# Patient Record
Sex: FEMALE | Race: WHITE | NOT HISPANIC OR LATINO | Employment: FULL TIME | URBAN - METROPOLITAN AREA
[De-identification: names, ages, dates, MRNs, and addresses within clinical notes are randomized per-mention and may not be internally consistent; named-entity substitution may affect disease eponyms.]

---

## 2017-01-06 ENCOUNTER — AMBULATORY - HEALTHEAST (OUTPATIENT)
Dept: NURSING | Facility: CLINIC | Age: 47
End: 2017-01-06

## 2017-01-13 ENCOUNTER — COMMUNICATION - HEALTHEAST (OUTPATIENT)
Dept: FAMILY MEDICINE | Facility: CLINIC | Age: 47
End: 2017-01-13

## 2017-01-19 ENCOUNTER — AMBULATORY - HEALTHEAST (OUTPATIENT)
Dept: NURSING | Facility: CLINIC | Age: 47
End: 2017-01-19

## 2017-01-19 DIAGNOSIS — E03.9 HYPOTHYROIDISM: ICD-10-CM

## 2017-01-19 DIAGNOSIS — E78.5 HYPERLIPIDEMIA, UNSPECIFIED HYPERLIPIDEMIA TYPE: ICD-10-CM

## 2017-01-19 LAB
CHOLEST SERPL-MCNC: 200 MG/DL
FASTING STATUS PATIENT QL REPORTED: YES
HDLC SERPL-MCNC: 76 MG/DL
LDLC SERPL CALC-MCNC: 104 MG/DL
TRIGL SERPL-MCNC: 98 MG/DL

## 2017-02-15 ENCOUNTER — COMMUNICATION - HEALTHEAST (OUTPATIENT)
Dept: FAMILY MEDICINE | Facility: CLINIC | Age: 47
End: 2017-02-15

## 2017-02-15 DIAGNOSIS — E03.9 HYPOTHYROIDISM: ICD-10-CM

## 2017-02-15 DIAGNOSIS — R53.83 FATIGUE: ICD-10-CM

## 2017-04-20 ENCOUNTER — COMMUNICATION - HEALTHEAST (OUTPATIENT)
Dept: FAMILY MEDICINE | Facility: CLINIC | Age: 47
End: 2017-04-20

## 2017-05-23 ENCOUNTER — COMMUNICATION - HEALTHEAST (OUTPATIENT)
Dept: FAMILY MEDICINE | Facility: CLINIC | Age: 47
End: 2017-05-23

## 2017-06-02 ENCOUNTER — OFFICE VISIT - HEALTHEAST (OUTPATIENT)
Dept: FAMILY MEDICINE | Facility: CLINIC | Age: 47
End: 2017-06-02

## 2017-06-02 DIAGNOSIS — E03.9 HYPOTHYROIDISM, UNSPECIFIED TYPE: ICD-10-CM

## 2017-06-02 DIAGNOSIS — M79.7 FIBROMYALGIA: ICD-10-CM

## 2017-06-02 DIAGNOSIS — H69.93 ETD (EUSTACHIAN TUBE DYSFUNCTION), BILATERAL: ICD-10-CM

## 2017-06-02 ASSESSMENT — MIFFLIN-ST. JEOR: SCORE: 1128.47

## 2017-06-02 NOTE — ASSESSMENT & PLAN NOTE
Given her current symptoms of fatigue and weight changes, will recheck thyroid level.  The way changes are most likely due to her continual change in dosing of her Lyrica and Wellbutrin.  Discussed the effects of starting and stopping as well as the beneficial effects of staying on continually.

## 2017-06-02 NOTE — ASSESSMENT & PLAN NOTE
Continue Lyrica at 75 mg, but use the 75 mg capsule and not trying to separate a 150 mg capsule.  Relaxation and dietary discussion.  Follow-up in 6 months, sooner if warning signs or symptoms asdiscussed.

## 2017-10-16 ENCOUNTER — COMMUNICATION - HEALTHEAST (OUTPATIENT)
Dept: FAMILY MEDICINE | Facility: CLINIC | Age: 47
End: 2017-10-16

## 2017-10-16 DIAGNOSIS — E03.9 HYPOTHYROIDISM: ICD-10-CM

## 2017-12-06 ENCOUNTER — COMMUNICATION - HEALTHEAST (OUTPATIENT)
Dept: SCHEDULING | Facility: CLINIC | Age: 47
End: 2017-12-06

## 2017-12-08 ENCOUNTER — OFFICE VISIT - HEALTHEAST (OUTPATIENT)
Dept: FAMILY MEDICINE | Facility: CLINIC | Age: 47
End: 2017-12-08

## 2017-12-08 DIAGNOSIS — M79.7 FIBROMYALGIA: ICD-10-CM

## 2017-12-08 DIAGNOSIS — E78.5 HYPERLIPIDEMIA, UNSPECIFIED HYPERLIPIDEMIA TYPE: ICD-10-CM

## 2017-12-08 DIAGNOSIS — R07.9 CHEST PAIN: ICD-10-CM

## 2017-12-08 DIAGNOSIS — E55.9 VITAMIN D DEFICIENCY: ICD-10-CM

## 2017-12-08 DIAGNOSIS — Z12.31 VISIT FOR SCREENING MAMMOGRAM: ICD-10-CM

## 2017-12-08 DIAGNOSIS — K21.9 GASTROESOPHAGEAL REFLUX DISEASE WITHOUT ESOPHAGITIS: ICD-10-CM

## 2017-12-08 DIAGNOSIS — E03.9 HYPOTHYROIDISM, UNSPECIFIED TYPE: ICD-10-CM

## 2017-12-08 LAB
ATRIAL RATE - MUSE: 65 BPM
DIASTOLIC BLOOD PRESSURE - MUSE: 60 MMHG
INTERPRETATION ECG - MUSE: NORMAL
P AXIS - MUSE: 58 DEGREES
PR INTERVAL - MUSE: 148 MS
QRS DURATION - MUSE: 74 MS
QT - MUSE: 384 MS
QTC - MUSE: 399 MS
R AXIS - MUSE: 86 DEGREES
SYSTOLIC BLOOD PRESSURE - MUSE: 104 MMHG
T AXIS - MUSE: 60 DEGREES
VENTRICULAR RATE- MUSE: 65 BPM

## 2017-12-08 ASSESSMENT — MIFFLIN-ST. JEOR: SCORE: 1157.95

## 2017-12-08 NOTE — ASSESSMENT & PLAN NOTE
Currently on levothyroxine 100 mcg daily.  Will recheck TSH and adjust dosing as needed.  If dose adjustment and recheck in 2 months, if on stable dose and recheck in 6 months at annual physical.

## 2017-12-08 NOTE — ASSESSMENT & PLAN NOTE
Start H2 blocker twice a day.  If not improving in the next 2 weeks, then will adjust anxiety medications by increasing Wellbutrin, and consider EGD for further evaluation.

## 2017-12-12 ENCOUNTER — HOSPITAL ENCOUNTER (OUTPATIENT)
Dept: MAMMOGRAPHY | Facility: HOSPITAL | Age: 47
Discharge: HOME | End: 2017-12-12
Attending: FAMILY MEDICINE

## 2017-12-12 DIAGNOSIS — Z12.31 VISIT FOR SCREENING MAMMOGRAM: ICD-10-CM

## 2017-12-26 ENCOUNTER — HOSPITAL ENCOUNTER (OUTPATIENT)
Dept: CARDIOLOGY | Facility: CLINIC | Age: 47
Discharge: HOME | End: 2017-12-26
Attending: FAMILY MEDICINE

## 2017-12-26 DIAGNOSIS — R07.9 CHEST PAIN: ICD-10-CM

## 2017-12-26 LAB
CV STRESS CURRENT BP HE: NORMAL
CV STRESS CURRENT HR HE: 100
CV STRESS CURRENT HR HE: 102
CV STRESS CURRENT HR HE: 105
CV STRESS CURRENT HR HE: 108
CV STRESS CURRENT HR HE: 109
CV STRESS CURRENT HR HE: 109
CV STRESS CURRENT HR HE: 115
CV STRESS CURRENT HR HE: 116
CV STRESS CURRENT HR HE: 117
CV STRESS CURRENT HR HE: 120
CV STRESS CURRENT HR HE: 120
CV STRESS CURRENT HR HE: 124
CV STRESS CURRENT HR HE: 126
CV STRESS CURRENT HR HE: 133
CV STRESS CURRENT HR HE: 138
CV STRESS CURRENT HR HE: 141
CV STRESS CURRENT HR HE: 142
CV STRESS CURRENT HR HE: 143
CV STRESS CURRENT HR HE: 145
CV STRESS CURRENT HR HE: 149
CV STRESS CURRENT HR HE: 149
CV STRESS CURRENT HR HE: 162
CV STRESS CURRENT HR HE: 163
CV STRESS CURRENT HR HE: 171
CV STRESS CURRENT HR HE: 173
CV STRESS CURRENT HR HE: 173
CV STRESS CURRENT HR HE: 89
CV STRESS CURRENT HR HE: 92
CV STRESS CURRENT HR HE: 95
CV STRESS CURRENT HR HE: 98
CV STRESS CURRENT HR HE: 99
CV STRESS DEVIATION TIME HE: NORMAL
CV STRESS ECHO PERCENT HR HE: NORMAL
CV STRESS EXERCISE STAGE HE: NORMAL
CV STRESS FINAL RESTING BP HE: NORMAL
CV STRESS FINAL RESTING HR HE: 95
CV STRESS MAX HR HE: 175
CV STRESS MAX TREADMILL GRADE HE: 16
CV STRESS MAX TREADMILL SPEED HE: 4.2
CV STRESS PEAK DIA BP HE: NORMAL
CV STRESS PEAK SYS BP HE: NORMAL
CV STRESS PHASE HE: NORMAL
CV STRESS PROTOCOL HE: NORMAL
CV STRESS RESTING PT POSITION HE: NORMAL
CV STRESS ST DEVIATION AMOUNT HE: NORMAL
CV STRESS ST DEVIATION ELEVATION HE: NORMAL
CV STRESS ST EVELATION AMOUNT HE: NORMAL
CV STRESS TEST TYPE HE: NORMAL
CV STRESS TOTAL STAGE TIME MIN 1 HE: NORMAL
STRESS ECHO BASELINE BP: NORMAL
STRESS ECHO BASELINE HR: 77
STRESS ECHO CALCULATED PERCENT HR: 101 %
STRESS ECHO LAST STRESS BP: NORMAL
STRESS ECHO LAST STRESS HR: 173
STRESS ECHO POST ESTIMATED WORKLOAD: 12.1
STRESS ECHO POST EXERCISE DUR MIN: 11
STRESS ECHO POST EXERCISE DUR SEC: 0
STRESS ECHO TARGET HR: 148

## 2018-01-16 ENCOUNTER — COMMUNICATION - HEALTHEAST (OUTPATIENT)
Dept: FAMILY MEDICINE | Facility: CLINIC | Age: 48
End: 2018-01-16

## 2018-01-16 DIAGNOSIS — E78.5 HYPERLIPIDEMIA: ICD-10-CM

## 2018-01-17 ENCOUNTER — COMMUNICATION - HEALTHEAST (OUTPATIENT)
Dept: FAMILY MEDICINE | Facility: CLINIC | Age: 48
End: 2018-01-17

## 2018-01-17 DIAGNOSIS — R07.9 CHEST PAIN: ICD-10-CM

## 2018-02-07 ENCOUNTER — COMMUNICATION - HEALTHEAST (OUTPATIENT)
Dept: FAMILY MEDICINE | Facility: CLINIC | Age: 48
End: 2018-02-07

## 2018-02-07 DIAGNOSIS — R07.9 CHEST PAIN: ICD-10-CM

## 2018-03-01 ENCOUNTER — OFFICE VISIT - HEALTHEAST (OUTPATIENT)
Dept: FAMILY MEDICINE | Facility: CLINIC | Age: 48
End: 2018-03-01

## 2018-03-01 DIAGNOSIS — R10.31 RIGHT LOWER QUADRANT ABDOMINAL PAIN: ICD-10-CM

## 2018-03-01 DIAGNOSIS — Z23 NEED FOR VACCINATION: ICD-10-CM

## 2018-03-01 DIAGNOSIS — M79.7 FIBROMYALGIA: ICD-10-CM

## 2018-03-01 ASSESSMENT — MIFFLIN-ST. JEOR: SCORE: 1155.23

## 2018-03-01 NOTE — ASSESSMENT & PLAN NOTE
Patient did restart her Wellbutrin and is feeling better since doing so.  She restarted 10 days ago.  She states her mood has improved considerably.  Will plan to continue at this time.

## 2018-04-20 ENCOUNTER — COMMUNICATION - HEALTHEAST (OUTPATIENT)
Dept: FAMILY MEDICINE | Facility: CLINIC | Age: 48
End: 2018-04-20

## 2018-04-20 DIAGNOSIS — R53.83 FATIGUE: ICD-10-CM

## 2018-07-14 ENCOUNTER — COMMUNICATION - HEALTHEAST (OUTPATIENT)
Dept: FAMILY MEDICINE | Facility: CLINIC | Age: 48
End: 2018-07-14

## 2018-07-14 DIAGNOSIS — E03.9 HYPOTHYROIDISM: ICD-10-CM

## 2018-10-16 ENCOUNTER — COMMUNICATION - HEALTHEAST (OUTPATIENT)
Dept: FAMILY MEDICINE | Facility: CLINIC | Age: 48
End: 2018-10-16

## 2018-10-16 DIAGNOSIS — R53.83 FATIGUE: ICD-10-CM

## 2018-10-16 DIAGNOSIS — R07.9 CHEST PAIN: ICD-10-CM

## 2018-10-26 ENCOUNTER — COMMUNICATION - HEALTHEAST (OUTPATIENT)
Dept: FAMILY MEDICINE | Facility: CLINIC | Age: 48
End: 2018-10-26

## 2018-10-26 DIAGNOSIS — E78.5 HYPERLIPIDEMIA: ICD-10-CM

## 2018-11-12 ENCOUNTER — AMBULATORY - HEALTHEAST (OUTPATIENT)
Dept: NURSING | Facility: CLINIC | Age: 48
End: 2018-11-12

## 2018-12-10 ENCOUNTER — OFFICE VISIT - HEALTHEAST (OUTPATIENT)
Dept: FAMILY MEDICINE | Facility: CLINIC | Age: 48
End: 2018-12-10

## 2018-12-10 DIAGNOSIS — E78.5 HYPERLIPIDEMIA, UNSPECIFIED HYPERLIPIDEMIA TYPE: ICD-10-CM

## 2018-12-10 DIAGNOSIS — E03.9 HYPOTHYROIDISM, UNSPECIFIED TYPE: ICD-10-CM

## 2018-12-10 DIAGNOSIS — M25.561 CHRONIC PAIN OF RIGHT KNEE: ICD-10-CM

## 2018-12-10 DIAGNOSIS — M67.40 GANGLION CYST: ICD-10-CM

## 2018-12-10 DIAGNOSIS — G89.29 CHRONIC PAIN OF RIGHT KNEE: ICD-10-CM

## 2018-12-10 DIAGNOSIS — E55.9 VITAMIN D DEFICIENCY: ICD-10-CM

## 2018-12-10 ASSESSMENT — MIFFLIN-ST. JEOR: SCORE: 1133.46

## 2018-12-12 ENCOUNTER — HOSPITAL ENCOUNTER (OUTPATIENT)
Dept: MRI IMAGING | Facility: CLINIC | Age: 48
Discharge: HOME | End: 2018-12-12
Attending: FAMILY MEDICINE

## 2018-12-12 ENCOUNTER — AMBULATORY - HEALTHEAST (OUTPATIENT)
Dept: LAB | Facility: CLINIC | Age: 48
End: 2018-12-12

## 2018-12-12 DIAGNOSIS — G89.29 CHRONIC PAIN OF RIGHT KNEE: ICD-10-CM

## 2018-12-12 DIAGNOSIS — M25.561 CHRONIC PAIN OF RIGHT KNEE: ICD-10-CM

## 2018-12-12 DIAGNOSIS — E55.9 VITAMIN D DEFICIENCY: ICD-10-CM

## 2018-12-12 DIAGNOSIS — E78.5 HYPERLIPIDEMIA, UNSPECIFIED HYPERLIPIDEMIA TYPE: ICD-10-CM

## 2018-12-12 DIAGNOSIS — E03.9 HYPOTHYROIDISM, UNSPECIFIED TYPE: ICD-10-CM

## 2018-12-12 LAB
ALT SERPL W P-5'-P-CCNC: 12 U/L (ref 0–45)
ANION GAP SERPL CALCULATED.3IONS-SCNC: 10 MMOL/L (ref 5–18)
BUN SERPL-MCNC: 10 MG/DL (ref 8–22)
CALCIUM SERPL-MCNC: 9.4 MG/DL (ref 8.5–10.5)
CHLORIDE BLD-SCNC: 104 MMOL/L (ref 98–107)
CHOLEST SERPL-MCNC: 198 MG/DL
CO2 SERPL-SCNC: 24 MMOL/L (ref 22–31)
CREAT SERPL-MCNC: 0.83 MG/DL (ref 0.6–1.1)
ERYTHROCYTE [DISTWIDTH] IN BLOOD BY AUTOMATED COUNT: 11.1 % (ref 11–14.5)
FASTING STATUS PATIENT QL REPORTED: YES
GFR SERPL CREATININE-BSD FRML MDRD: >60 ML/MIN/1.73M2
GLUCOSE BLD-MCNC: 82 MG/DL (ref 70–125)
HCT VFR BLD AUTO: 39.3 % (ref 35–47)
HDLC SERPL-MCNC: 77 MG/DL
HGB BLD-MCNC: 13.1 G/DL (ref 12–16)
LDLC SERPL CALC-MCNC: 104 MG/DL
MCH RBC QN AUTO: 30 PG (ref 27–34)
MCHC RBC AUTO-ENTMCNC: 33.3 G/DL (ref 32–36)
MCV RBC AUTO: 90 FL (ref 80–100)
PLATELET # BLD AUTO: 267 THOU/UL (ref 140–440)
PMV BLD AUTO: 7.7 FL (ref 7–10)
POTASSIUM BLD-SCNC: 4.5 MMOL/L (ref 3.5–5)
RBC # BLD AUTO: 4.36 MILL/UL (ref 3.8–5.4)
SODIUM SERPL-SCNC: 138 MMOL/L (ref 136–145)
TRIGL SERPL-MCNC: 84 MG/DL
TSH SERPL DL<=0.005 MIU/L-ACNC: 1.97 UIU/ML (ref 0.3–5)
WBC: 7.9 THOU/UL (ref 4–11)

## 2018-12-13 LAB
25(OH)D3 SERPL-MCNC: 27.5 NG/ML (ref 30–80)
25(OH)D3 SERPL-MCNC: 27.5 NG/ML (ref 30–80)

## 2018-12-17 ENCOUNTER — AMBULATORY - HEALTHEAST (OUTPATIENT)
Dept: FAMILY MEDICINE | Facility: CLINIC | Age: 48
End: 2018-12-17

## 2018-12-17 ENCOUNTER — COMMUNICATION - HEALTHEAST (OUTPATIENT)
Dept: FAMILY MEDICINE | Facility: CLINIC | Age: 48
End: 2018-12-17

## 2018-12-17 DIAGNOSIS — E55.9 VITAMIN D DEFICIENCY: ICD-10-CM

## 2018-12-17 DIAGNOSIS — G89.29 CHRONIC PAIN OF RIGHT KNEE: ICD-10-CM

## 2018-12-17 DIAGNOSIS — M25.561 CHRONIC PAIN OF RIGHT KNEE: ICD-10-CM

## 2019-01-07 ENCOUNTER — OFFICE VISIT - HEALTHEAST (OUTPATIENT)
Dept: FAMILY MEDICINE | Facility: CLINIC | Age: 49
End: 2019-01-07

## 2019-01-07 DIAGNOSIS — R39.9 UTI SYMPTOMS: ICD-10-CM

## 2019-01-07 LAB
ALBUMIN UR-MCNC: NEGATIVE MG/DL
APPEARANCE UR: CLEAR
BILIRUB UR QL STRIP: NEGATIVE
COLOR UR AUTO: YELLOW
GLUCOSE UR STRIP-MCNC: NEGATIVE MG/DL
HGB UR QL STRIP: ABNORMAL
KETONES UR STRIP-MCNC: NEGATIVE MG/DL
LEUKOCYTE ESTERASE UR QL STRIP: NEGATIVE
NITRATE UR QL: NEGATIVE
PH UR STRIP: 7 [PH] (ref 5–8)
SP GR UR STRIP: 1.02 (ref 1–1.03)
UROBILINOGEN UR STRIP-ACNC: ABNORMAL

## 2019-01-08 LAB — BACTERIA SPEC CULT: NO GROWTH

## 2019-01-09 ENCOUNTER — COMMUNICATION - HEALTHEAST (OUTPATIENT)
Dept: FAMILY MEDICINE | Facility: CLINIC | Age: 49
End: 2019-01-09

## 2019-01-15 ENCOUNTER — RECORDS - HEALTHEAST (OUTPATIENT)
Dept: ADMINISTRATIVE | Facility: OTHER | Age: 49
End: 2019-01-15

## 2019-01-15 ENCOUNTER — TRANSFERRED RECORDS (OUTPATIENT)
Dept: MULTI SPECIALTY CLINIC | Facility: CLINIC | Age: 49
End: 2019-01-15

## 2019-01-15 LAB
HPV_EXT - HISTORICAL: NORMAL
PAP-ABSTRACT: NORMAL

## 2019-01-16 ENCOUNTER — COMMUNICATION - HEALTHEAST (OUTPATIENT)
Dept: FAMILY MEDICINE | Facility: CLINIC | Age: 49
End: 2019-01-16

## 2019-01-16 ENCOUNTER — RECORDS - HEALTHEAST (OUTPATIENT)
Dept: ADMINISTRATIVE | Facility: OTHER | Age: 49
End: 2019-01-16

## 2019-01-20 ENCOUNTER — COMMUNICATION - HEALTHEAST (OUTPATIENT)
Dept: FAMILY MEDICINE | Facility: CLINIC | Age: 49
End: 2019-01-20

## 2019-01-20 DIAGNOSIS — E03.9 HYPOTHYROIDISM: ICD-10-CM

## 2019-01-23 ENCOUNTER — COMMUNICATION - HEALTHEAST (OUTPATIENT)
Dept: FAMILY MEDICINE | Facility: CLINIC | Age: 49
End: 2019-01-23

## 2019-01-23 DIAGNOSIS — E78.5 HYPERLIPIDEMIA: ICD-10-CM

## 2019-04-12 ENCOUNTER — OFFICE VISIT - HEALTHEAST (OUTPATIENT)
Dept: FAMILY MEDICINE | Facility: CLINIC | Age: 49
End: 2019-04-12

## 2019-04-12 DIAGNOSIS — M75.82 TENDINITIS OF LEFT PECTORALIS MAJOR: ICD-10-CM

## 2019-04-12 DIAGNOSIS — Z12.31 VISIT FOR SCREENING MAMMOGRAM: ICD-10-CM

## 2019-04-12 ASSESSMENT — MIFFLIN-ST. JEOR: SCORE: 1127.11

## 2019-04-20 ENCOUNTER — COMMUNICATION - HEALTHEAST (OUTPATIENT)
Dept: FAMILY MEDICINE | Facility: CLINIC | Age: 49
End: 2019-04-20

## 2019-04-20 DIAGNOSIS — R53.83 FATIGUE: ICD-10-CM

## 2019-04-20 DIAGNOSIS — R07.9 CHEST PAIN: ICD-10-CM

## 2019-04-30 ENCOUNTER — HOSPITAL ENCOUNTER (OUTPATIENT)
Dept: MAMMOGRAPHY | Facility: CLINIC | Age: 49
Discharge: HOME | End: 2019-04-30
Attending: FAMILY MEDICINE

## 2019-04-30 DIAGNOSIS — Z12.31 VISIT FOR SCREENING MAMMOGRAM: ICD-10-CM

## 2019-05-28 ENCOUNTER — OFFICE VISIT - HEALTHEAST (OUTPATIENT)
Dept: PHYSICAL THERAPY | Facility: REHABILITATION | Age: 49
End: 2019-05-28

## 2019-05-28 DIAGNOSIS — G89.29 CHRONIC PAIN OF RIGHT KNEE: ICD-10-CM

## 2019-05-28 DIAGNOSIS — M25.561 CHRONIC PAIN OF RIGHT KNEE: ICD-10-CM

## 2019-05-28 DIAGNOSIS — M62.81 GENERALIZED MUSCLE WEAKNESS: ICD-10-CM

## 2019-06-04 ENCOUNTER — OFFICE VISIT - HEALTHEAST (OUTPATIENT)
Dept: PHYSICAL THERAPY | Facility: REHABILITATION | Age: 49
End: 2019-06-04

## 2019-06-04 DIAGNOSIS — M62.81 GENERALIZED MUSCLE WEAKNESS: ICD-10-CM

## 2019-06-04 DIAGNOSIS — G89.29 CHRONIC PAIN OF RIGHT KNEE: ICD-10-CM

## 2019-06-04 DIAGNOSIS — M25.561 CHRONIC PAIN OF RIGHT KNEE: ICD-10-CM

## 2019-06-11 ENCOUNTER — OFFICE VISIT - HEALTHEAST (OUTPATIENT)
Dept: PHYSICAL THERAPY | Facility: REHABILITATION | Age: 49
End: 2019-06-11

## 2019-06-11 DIAGNOSIS — M62.81 GENERALIZED MUSCLE WEAKNESS: ICD-10-CM

## 2019-06-11 DIAGNOSIS — G89.29 CHRONIC PAIN OF RIGHT KNEE: ICD-10-CM

## 2019-06-11 DIAGNOSIS — M25.561 CHRONIC PAIN OF RIGHT KNEE: ICD-10-CM

## 2019-06-18 ENCOUNTER — OFFICE VISIT - HEALTHEAST (OUTPATIENT)
Dept: PHYSICAL THERAPY | Facility: REHABILITATION | Age: 49
End: 2019-06-18

## 2019-06-18 DIAGNOSIS — G89.29 CHRONIC PAIN OF RIGHT KNEE: ICD-10-CM

## 2019-06-18 DIAGNOSIS — M25.561 CHRONIC PAIN OF RIGHT KNEE: ICD-10-CM

## 2019-06-18 DIAGNOSIS — M62.81 GENERALIZED MUSCLE WEAKNESS: ICD-10-CM

## 2019-07-02 ENCOUNTER — OFFICE VISIT - HEALTHEAST (OUTPATIENT)
Dept: PHYSICAL THERAPY | Facility: REHABILITATION | Age: 49
End: 2019-07-02

## 2019-07-02 DIAGNOSIS — M25.561 CHRONIC PAIN OF RIGHT KNEE: ICD-10-CM

## 2019-07-02 DIAGNOSIS — M62.81 GENERALIZED MUSCLE WEAKNESS: ICD-10-CM

## 2019-07-02 DIAGNOSIS — G89.29 CHRONIC PAIN OF RIGHT KNEE: ICD-10-CM

## 2019-07-10 ENCOUNTER — OFFICE VISIT - HEALTHEAST (OUTPATIENT)
Dept: FAMILY MEDICINE | Facility: CLINIC | Age: 49
End: 2019-07-10

## 2019-07-10 DIAGNOSIS — L71.9 ROSACEA: ICD-10-CM
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
## 2019-07-10 ASSESSMENT — MIFFLIN-ST. JEOR: SCORE: 1139.81

## 2019-07-16 ENCOUNTER — OFFICE VISIT - HEALTHEAST (OUTPATIENT)
Dept: PHYSICAL THERAPY | Facility: REHABILITATION | Age: 49
End: 2019-07-16

## 2019-07-16 DIAGNOSIS — M62.81 GENERALIZED MUSCLE WEAKNESS: ICD-10-CM

## 2019-07-16 DIAGNOSIS — M25.561 CHRONIC PAIN OF RIGHT KNEE: ICD-10-CM

## 2019-07-16 DIAGNOSIS — G89.29 CHRONIC PAIN OF RIGHT KNEE: ICD-10-CM

## 2019-07-31 ENCOUNTER — AMBULATORY - HEALTHEAST (OUTPATIENT)
Dept: LAB | Facility: CLINIC | Age: 49
End: 2019-07-31

## 2019-07-31 ENCOUNTER — AMBULATORY - HEALTHEAST (OUTPATIENT)
Dept: FAMILY MEDICINE | Facility: CLINIC | Age: 49
End: 2019-07-31

## 2019-07-31 ENCOUNTER — OFFICE VISIT - HEALTHEAST (OUTPATIENT)
Dept: FAMILY MEDICINE | Facility: CLINIC | Age: 49
End: 2019-07-31

## 2019-07-31 DIAGNOSIS — E03.9 HYPOTHYROIDISM, UNSPECIFIED TYPE: ICD-10-CM

## 2019-07-31 DIAGNOSIS — E55.9 VITAMIN D DEFICIENCY: ICD-10-CM

## 2019-07-31 DIAGNOSIS — E78.5 HYPERLIPIDEMIA, UNSPECIFIED HYPERLIPIDEMIA TYPE: ICD-10-CM

## 2019-07-31 DIAGNOSIS — K21.9 GASTROESOPHAGEAL REFLUX DISEASE WITHOUT ESOPHAGITIS: ICD-10-CM

## 2019-07-31 DIAGNOSIS — M79.7 FIBROMYALGIA: ICD-10-CM

## 2019-07-31 DIAGNOSIS — L71.9 ROSACEA: ICD-10-CM

## 2019-07-31 DIAGNOSIS — Z11.4 SCREENING FOR HIV WITHOUT PRESENCE OF RISK FACTORS: ICD-10-CM

## 2019-07-31 DIAGNOSIS — Z13.220 LIPID SCREENING: ICD-10-CM

## 2019-07-31 DIAGNOSIS — Z13.1 DIABETES MELLITUS SCREENING: ICD-10-CM

## 2019-07-31 DIAGNOSIS — Z00.00 ROUTINE GENERAL MEDICAL EXAMINATION AT A HEALTH CARE FACILITY: ICD-10-CM

## 2019-07-31 LAB
ALT SERPL W P-5'-P-CCNC: 11 U/L (ref 0–45)
ANION GAP SERPL CALCULATED.3IONS-SCNC: 11 MMOL/L (ref 5–18)
BUN SERPL-MCNC: 12 MG/DL (ref 8–22)
CALCIUM SERPL-MCNC: 9.9 MG/DL (ref 8.5–10.5)
CHLORIDE BLD-SCNC: 103 MMOL/L (ref 98–107)
CHOLEST SERPL-MCNC: 227 MG/DL
CO2 SERPL-SCNC: 24 MMOL/L (ref 22–31)
CREAT SERPL-MCNC: 0.98 MG/DL (ref 0.6–1.1)
ERYTHROCYTE [DISTWIDTH] IN BLOOD BY AUTOMATED COUNT: 11.9 % (ref 11–14.5)
FASTING STATUS PATIENT QL REPORTED: YES
GFR SERPL CREATININE-BSD FRML MDRD: >60 ML/MIN/1.73M2
GLUCOSE BLD-MCNC: 88 MG/DL (ref 70–125)
HCT VFR BLD AUTO: 41.8 % (ref 35–47)
HDLC SERPL-MCNC: 73 MG/DL
HGB BLD-MCNC: 14 G/DL (ref 12–16)
HIV 1+2 AB+HIV1 P24 AG SERPL QL IA: NEGATIVE
LDLC SERPL CALC-MCNC: 127 MG/DL
MCH RBC QN AUTO: 30.2 PG (ref 27–34)
MCHC RBC AUTO-ENTMCNC: 33.5 G/DL (ref 32–36)
MCV RBC AUTO: 90 FL (ref 80–100)
PLATELET # BLD AUTO: 295 THOU/UL (ref 140–440)
PMV BLD AUTO: 7.9 FL (ref 7–10)
POTASSIUM BLD-SCNC: 4.3 MMOL/L (ref 3.5–5)
RBC # BLD AUTO: 4.63 MILL/UL (ref 3.8–5.4)
SODIUM SERPL-SCNC: 138 MMOL/L (ref 136–145)
TRIGL SERPL-MCNC: 137 MG/DL
TSH SERPL DL<=0.005 MIU/L-ACNC: 1.82 UIU/ML (ref 0.3–5)
WBC: 6.4 THOU/UL (ref 4–11)

## 2019-07-31 ASSESSMENT — MIFFLIN-ST. JEOR: SCORE: 1135.27

## 2019-07-31 NOTE — ASSESSMENT & PLAN NOTE
Slight worsening with current life changes.  With through that this is very similar to her grieving process.  Did note that patient isstill making very good choices and staying active in working on doing what her family needs this bite the emotional changes.  Safety plan discussed.  She and her  are going through counseling on their own.  She also has counseling on her own.

## 2019-08-01 LAB
25(OH)D3 SERPL-MCNC: 40.5 NG/ML (ref 30–80)
25(OH)D3 SERPL-MCNC: 40.5 NG/ML (ref 30–80)

## 2019-10-07 ENCOUNTER — RECORDS - HEALTHEAST (OUTPATIENT)
Dept: HEALTH INFORMATION MANAGEMENT | Facility: CLINIC | Age: 49
End: 2019-10-07

## 2020-01-11 ENCOUNTER — COMMUNICATION - HEALTHEAST (OUTPATIENT)
Dept: FAMILY MEDICINE | Facility: CLINIC | Age: 50
End: 2020-01-11

## 2020-01-11 DIAGNOSIS — E78.5 HYPERLIPIDEMIA: ICD-10-CM

## 2020-01-11 DIAGNOSIS — E03.9 HYPOTHYROIDISM: ICD-10-CM

## 2020-01-27 ENCOUNTER — OFFICE VISIT - HEALTHEAST (OUTPATIENT)
Dept: FAMILY MEDICINE | Facility: CLINIC | Age: 50
End: 2020-01-27

## 2020-01-27 DIAGNOSIS — R53.83 OTHER FATIGUE: ICD-10-CM

## 2020-01-27 DIAGNOSIS — M79.7 FIBROMYALGIA: ICD-10-CM

## 2020-01-27 DIAGNOSIS — E03.9 HYPOTHYROIDISM, UNSPECIFIED TYPE: ICD-10-CM

## 2020-01-27 LAB
ERYTHROCYTE [DISTWIDTH] IN BLOOD BY AUTOMATED COUNT: 10.8 % (ref 11–14.5)
HCT VFR BLD AUTO: 42.6 % (ref 35–47)
HGB BLD-MCNC: 14.1 G/DL (ref 12–16)
MCH RBC QN AUTO: 31 PG (ref 27–34)
MCHC RBC AUTO-ENTMCNC: 33.1 G/DL (ref 32–36)
MCV RBC AUTO: 94 FL (ref 80–100)
PLATELET # BLD AUTO: 332 THOU/UL (ref 140–440)
PMV BLD AUTO: 7.4 FL (ref 7–10)
RBC # BLD AUTO: 4.56 MILL/UL (ref 3.8–5.4)
TSH SERPL DL<=0.005 MIU/L-ACNC: 1.3 UIU/ML (ref 0.3–5)
WBC: 7 THOU/UL (ref 4–11)

## 2020-01-27 ASSESSMENT — MIFFLIN-ST. JEOR: SCORE: 1128.92

## 2020-01-27 NOTE — ASSESSMENT & PLAN NOTE
Diet and exercise discussed.  Counseling services through work also discussed.  If those are not avenues for her, she will contact [facility] I can put in referral for counseling specifically.  Also will check labs as per orders.  If TSH and CBC are normal range then would look at increasing Wellbutrin to 300 mg for the next 2 to 3 months during adjustment.  And then come back down to the 150.

## 2020-01-31 ENCOUNTER — AMBULATORY - HEALTHEAST (OUTPATIENT)
Dept: FAMILY MEDICINE | Facility: CLINIC | Age: 50
End: 2020-01-31

## 2020-01-31 DIAGNOSIS — R53.83 FATIGUE: ICD-10-CM

## 2020-02-07 ENCOUNTER — COMMUNICATION - HEALTHEAST (OUTPATIENT)
Dept: FAMILY MEDICINE | Facility: CLINIC | Age: 50
End: 2020-02-07

## 2020-03-04 ENCOUNTER — OFFICE VISIT - HEALTHEAST (OUTPATIENT)
Dept: FAMILY MEDICINE | Facility: CLINIC | Age: 50
End: 2020-03-04

## 2020-03-04 DIAGNOSIS — R53.83 FATIGUE: ICD-10-CM

## 2020-03-04 DIAGNOSIS — M79.7 FIBROMYALGIA: ICD-10-CM

## 2020-03-04 ASSESSMENT — ANXIETY QUESTIONNAIRES
4. TROUBLE RELAXING: NOT AT ALL
GAD7 TOTAL SCORE: 9
3. WORRYING TOO MUCH ABOUT DIFFERENT THINGS: SEVERAL DAYS
6. BECOMING EASILY ANNOYED OR IRRITABLE: SEVERAL DAYS
2. NOT BEING ABLE TO STOP OR CONTROL WORRYING: NEARLY EVERY DAY
1. FEELING NERVOUS, ANXIOUS, OR ON EDGE: SEVERAL DAYS
7. FEELING AFRAID AS IF SOMETHING AWFUL MIGHT HAPPEN: NEARLY EVERY DAY
5. BEING SO RESTLESS THAT IT IS HARD TO SIT STILL: NOT AT ALL
IF YOU CHECKED OFF ANY PROBLEMS ON THIS QUESTIONNAIRE, HOW DIFFICULT HAVE THESE PROBLEMS MADE IT FOR YOU TO DO YOUR WORK, TAKE CARE OF THINGS AT HOME, OR GET ALONG WITH OTHER PEOPLE: VERY DIFFICULT

## 2020-03-04 ASSESSMENT — MIFFLIN-ST. JEOR: SCORE: 1125.75

## 2020-03-04 ASSESSMENT — PATIENT HEALTH QUESTIONNAIRE - PHQ9: SUM OF ALL RESPONSES TO PHQ QUESTIONS 1-9: 9

## 2020-03-04 NOTE — ASSESSMENT & PLAN NOTE
Add in sertraline at 50 mg daily.  Side effects precautions discussed.  Safety plan reviewed.  Continue with counseling.

## 2020-03-21 ENCOUNTER — COMMUNICATION - HEALTHEAST (OUTPATIENT)
Dept: SCHEDULING | Facility: CLINIC | Age: 50
End: 2020-03-21

## 2020-04-13 ENCOUNTER — COMMUNICATION - HEALTHEAST (OUTPATIENT)
Dept: FAMILY MEDICINE | Facility: CLINIC | Age: 50
End: 2020-04-13

## 2020-04-13 DIAGNOSIS — R07.9 CHEST PAIN: ICD-10-CM

## 2020-04-14 ENCOUNTER — COMMUNICATION - HEALTHEAST (OUTPATIENT)
Dept: FAMILY MEDICINE | Facility: CLINIC | Age: 50
End: 2020-04-14

## 2020-04-14 DIAGNOSIS — L71.9 ROSACEA: ICD-10-CM

## 2020-05-24 ENCOUNTER — COMMUNICATION - HEALTHEAST (OUTPATIENT)
Dept: FAMILY MEDICINE | Facility: CLINIC | Age: 50
End: 2020-05-24

## 2020-05-24 DIAGNOSIS — E78.5 HYPERLIPIDEMIA: ICD-10-CM

## 2020-05-27 RX ORDER — SIMVASTATIN 40 MG
TABLET ORAL
Qty: 90 TABLET | Refills: 3 | Status: SHIPPED | OUTPATIENT
Start: 2020-05-27 | End: 2021-11-19

## 2020-07-04 ENCOUNTER — COMMUNICATION - HEALTHEAST (OUTPATIENT)
Dept: SCHEDULING | Facility: CLINIC | Age: 50
End: 2020-07-04

## 2020-07-10 ENCOUNTER — COMMUNICATION - HEALTHEAST (OUTPATIENT)
Dept: FAMILY MEDICINE | Facility: CLINIC | Age: 50
End: 2020-07-10

## 2020-07-10 DIAGNOSIS — E03.9 HYPOTHYROIDISM: ICD-10-CM

## 2020-07-17 ENCOUNTER — OFFICE VISIT - HEALTHEAST (OUTPATIENT)
Dept: FAMILY MEDICINE | Facility: CLINIC | Age: 50
End: 2020-07-17

## 2020-07-17 DIAGNOSIS — M79.10 MYALGIA: ICD-10-CM

## 2020-07-17 DIAGNOSIS — E03.9 HYPOTHYROIDISM, UNSPECIFIED TYPE: ICD-10-CM

## 2020-07-17 DIAGNOSIS — Z12.31 VISIT FOR SCREENING MAMMOGRAM: ICD-10-CM

## 2020-07-17 LAB
ERYTHROCYTE [DISTWIDTH] IN BLOOD BY AUTOMATED COUNT: 11.8 % (ref 11–14.5)
HCT VFR BLD AUTO: 41.5 % (ref 35–47)
HGB BLD-MCNC: 13.6 G/DL (ref 12–16)
MCH RBC QN AUTO: 30.9 PG (ref 27–34)
MCHC RBC AUTO-ENTMCNC: 32.8 G/DL (ref 32–36)
MCV RBC AUTO: 94 FL (ref 80–100)
PLATELET # BLD AUTO: 278 THOU/UL (ref 140–440)
PMV BLD AUTO: 7.8 FL (ref 7–10)
RBC # BLD AUTO: 4.39 MILL/UL (ref 3.8–5.4)
TSH SERPL DL<=0.005 MIU/L-ACNC: 0.52 UIU/ML (ref 0.3–5)
WBC: 6.5 THOU/UL (ref 4–11)

## 2020-07-17 ASSESSMENT — MIFFLIN-ST. JEOR: SCORE: 1123.02

## 2020-07-17 NOTE — ASSESSMENT & PLAN NOTE
Given the tick bite, timing of symptoms, headache, and fatigue will start on doxycycline for 10-day cycle.  Will check for Lyme's cascade.  If that is positive will extend doxycycline treatment to a total of 21 days.    Given her work environment and possible exposure greater than 14 days ago will get serology testing for COVID

## 2020-07-17 NOTE — ASSESSMENT & PLAN NOTE
Recheck level today with other lab draw.  Patient has been stable and levothyroxine at 100 mcg daily.

## 2020-07-20 LAB — B BURGDOR IGG+IGM SER QL: 0.05 INDEX VALUE

## 2020-07-23 ENCOUNTER — COMMUNICATION - HEALTHEAST (OUTPATIENT)
Dept: FAMILY MEDICINE | Facility: CLINIC | Age: 50
End: 2020-07-23

## 2020-09-03 ENCOUNTER — RECORDS - HEALTHEAST (OUTPATIENT)
Dept: ADMINISTRATIVE | Facility: OTHER | Age: 50
End: 2020-09-03

## 2020-10-04 ENCOUNTER — COMMUNICATION - HEALTHEAST (OUTPATIENT)
Dept: FAMILY MEDICINE | Facility: CLINIC | Age: 50
End: 2020-10-04

## 2020-10-04 DIAGNOSIS — L71.9 ROSACEA: ICD-10-CM

## 2021-01-04 ENCOUNTER — COMMUNICATION - HEALTHEAST (OUTPATIENT)
Dept: FAMILY MEDICINE | Facility: CLINIC | Age: 51
End: 2021-01-04

## 2021-01-04 DIAGNOSIS — R53.83 FATIGUE: ICD-10-CM

## 2021-01-04 DIAGNOSIS — M79.7 FIBROMYALGIA: ICD-10-CM

## 2021-03-29 ENCOUNTER — OFFICE VISIT - HEALTHEAST (OUTPATIENT)
Dept: FAMILY MEDICINE | Facility: CLINIC | Age: 51
End: 2021-03-29

## 2021-03-29 ENCOUNTER — COMMUNICATION - HEALTHEAST (OUTPATIENT)
Dept: SCHEDULING | Facility: CLINIC | Age: 51
End: 2021-03-29

## 2021-03-29 DIAGNOSIS — H53.9 VISION CHANGES: ICD-10-CM

## 2021-03-29 ASSESSMENT — PATIENT HEALTH QUESTIONNAIRE - PHQ9: SUM OF ALL RESPONSES TO PHQ QUESTIONS 1-9: 4

## 2021-04-03 ENCOUNTER — COMMUNICATION - HEALTHEAST (OUTPATIENT)
Dept: FAMILY MEDICINE | Facility: CLINIC | Age: 51
End: 2021-04-03

## 2021-04-03 DIAGNOSIS — M79.7 FIBROMYALGIA: ICD-10-CM

## 2021-04-03 DIAGNOSIS — L71.9 ROSACEA: ICD-10-CM

## 2021-04-03 DIAGNOSIS — E03.9 HYPOTHYROIDISM: ICD-10-CM

## 2021-04-03 DIAGNOSIS — R53.83 FATIGUE: ICD-10-CM

## 2021-04-05 RX ORDER — BUPROPION HYDROCHLORIDE 150 MG/1
TABLET ORAL
Qty: 180 TABLET | Refills: 3 | Status: SHIPPED | OUTPATIENT
Start: 2021-04-05 | End: 2022-03-24

## 2021-04-05 RX ORDER — SPIRONOLACTONE 50 MG/1
TABLET, FILM COATED ORAL
Qty: 90 TABLET | Refills: 1 | Status: SHIPPED | OUTPATIENT
Start: 2021-04-05 | End: 2021-09-29

## 2021-04-05 RX ORDER — LEVOTHYROXINE SODIUM 100 UG/1
TABLET ORAL
Qty: 90 TABLET | Refills: 2 | Status: SHIPPED | OUTPATIENT
Start: 2021-04-05 | End: 2021-12-28

## 2021-05-27 ASSESSMENT — PATIENT HEALTH QUESTIONNAIRE - PHQ9
SUM OF ALL RESPONSES TO PHQ QUESTIONS 1-9: 9
SUM OF ALL RESPONSES TO PHQ QUESTIONS 1-9: 4

## 2021-05-27 NOTE — PROGRESS NOTES
"Assessment/Plan:     Problem List Items Addressed This Visit     None      Visit Diagnoses     Tendinitis of left pectoralis major    -  Primary    Home exercises discussed and demonstrated.  If not fully resolved in 2 week then will start into physical therapy.    Visit for screening mammogram        Relevant Orders    Mammo Screening Bilateral        Return in about 4 months (around 8/12/2019) for Annual physical.    Subjective:   48 y.o. female presents for pain in left axilla for over 2-1/2 weeks.  Patient thinks is actually been closer to 1 month.  She thinks it started soon after needing to snow blow the driveway a couple of times.  She finds when he she reaches overhead she feels a pulling in the armpit that leads underneath her chest wall.  No shortness of breath, dyspnea exertion, nausea or vomiting.  No weakness of the arm.  No pain in the shoulder joint itself.  She has not noticed any swelling or changes in her breast tissue.  No fevers or chills or recent illness.  Does not recall any trauma.          Review of Systems   Constitutional: Negative for chills, fatigue and fever.   Eyes: Negative for visual disturbance.   Respiratory: Negative for chest tightness and shortness of breath.    Cardiovascular: Negative for chest pain, palpitations and leg swelling.   Gastrointestinal: Negative for constipation, diarrhea, nausea and vomiting.   Genitourinary: Negative for difficulty urinating.        History     Reviewed By Date/Time Sections Reviewed    Clemente Barrios DO 4/12/2019  9:57 AM Medical, Surgical, Tobacco, Alcohol, Drug Use, Sexual Activity, Family    Alexis Preciado Jr., [facility] 4/12/2019  9:33 AM Tobacco           Objective:     Vitals:    04/12/19 0930   BP: 102/68   Pulse: 64   Resp: 12   Temp: 98.3  F (36.8  C)   TempSrc: Oral   Weight: 117 lb 8 oz (53.3 kg)   Height: 5' 3\" (1.6 m)   PainSc:   8   PainLoc: Arm     Physical Exam   Constitutional: She is oriented to person, place, and " time. She appears well-developed and well-nourished.   HENT:   Head: Normocephalic and atraumatic.   Cardiovascular: Normal rate, regular rhythm, normal heart sounds and intact distal pulses.   Pulmonary/Chest: Effort normal and breath sounds normal.   Musculoskeletal: She exhibits no edema.   Tender palpation along the insertion of pectoralis major.  5/5 strength in both extremities and shoulder with full active range of motion.   Lymphadenopathy:        Head (right side): No preauricular, no posterior auricular and no occipital adenopathy present.        Head (left side): No preauricular, no posterior auricular and no occipital adenopathy present.        Right cervical: No superficial cervical, no deep cervical and no posterior cervical adenopathy present.       Left cervical: No superficial cervical, no deep cervical and no posterior cervical adenopathy present.     She has no axillary adenopathy.        Right axillary: No pectoral and no lateral adenopathy present.        Left axillary: No pectoral and no lateral adenopathy present.  Neurological: She is alert and oriented to person, place, and time.   Skin: Skin is warm. Capillary refill takes less than 2 seconds.   Psychiatric: She has a normal mood and affect.   Vitals reviewed.      This note has been dictated using voice recognition software. Any grammatical or context distortions are unintentional and inherent to the software

## 2021-05-28 ASSESSMENT — ANXIETY QUESTIONNAIRES: GAD7 TOTAL SCORE: 9

## 2021-05-28 NOTE — TELEPHONE ENCOUNTER
Refill Approved    Rx renewed per Medication Renewal Policy. Medication was last renewed on 10/17/18.    Aniyah Alcala, [facility] Triage/Med Refill 4/23/2019     Requested Prescriptions   Pending Prescriptions Disp Refills     ranitidine (ZANTAC) 150 MG tablet [Pharmacy Med Name: RANITIDINE 150 MG TABLET] 180 tablet 1     Sig: TAKE 1 TABLET (150 MG TOTAL) BY MOUTH 2 (TWO) TIMES A DAY.       GI Medications Refill Protocol Passed - 4/20/2019  8:34 AM        Passed - PCP or prescribing provider visit in last 12 or next 3 months.     Last office visit with prescriber/PCP: 4/12/2019 Clemente Barrios DO OR same dept: 4/12/2019 Clemente Barrios DO OR same specialty: 4/12/2019 Clemente Barrios DO  Last physical: 12/21/2015 Last MTM visit: Visit date not found   Next visit within 3 mo: Visit date not found  Next physical within 3 mo: Visit date not found  Prescriber OR PCP: Clemente Barrios DO  Last diagnosis associated with med order: 1. Chest pain  - ranitidine (ZANTAC) 150 MG tablet [Pharmacy Med Name: RANITIDINE 150 MG TABLET]; TAKE 1 TABLET (150 MG TOTAL) BY MOUTH 2 (TWO) TIMES A DAY.  Dispense: 180 tablet; Refill: 1    2. Fatigue  - buPROPion (WELLBUTRIN XL) 150 MG 24 hr tablet [Pharmacy Med Name: BUPROPION HCL  MG TABLET]; TAKE 1 TABLET (150 MG TOTAL) BY MOUTH DAILY.  Dispense: 90 tablet; Refill: 1    If protocol passes may refill for 12 months if within 3 months of last provider visit (or a total of 15 months).             buPROPion (WELLBUTRIN XL) 150 MG 24 hr tablet [Pharmacy Med Name: BUPROPION HCL  MG TABLET] 90 tablet 1     Sig: TAKE 1 TABLET (150 MG TOTAL) BY MOUTH DAILY.       Tricyclics/Misc Antidepressant/Antianxiety Meds Refill Protocol Passed - 4/20/2019  8:34 AM        Passed - PCP or prescribing provider visit in last year     Last office visit with prescriber/PCP: 4/12/2019 Clemente Barrios DO OR same dept: 4/12/2019 Clemente Barrios  DO [facility] OR same specialty: 4/12/2019 Clemente Barrios DO  Last physical: 12/21/2015 Last MTM visit: Visit date not found   Next visit within 3 mo: Visit date not found  Next physical within 3 mo: Visit date not found  Prescriber OR PCP: Clemente Barrios DO  Last diagnosis associated with med order: 1. Chest pain  - ranitidine (ZANTAC) 150 MG tablet [Pharmacy Med Name: RANITIDINE 150 MG TABLET]; TAKE 1 TABLET (150 MG TOTAL) BY MOUTH 2 (TWO) TIMES A DAY.  Dispense: 180 tablet; Refill: 1    2. Fatigue  - buPROPion (WELLBUTRIN XL) 150 MG 24 hr tablet [Pharmacy Med Name: BUPROPION HCL  MG TABLET]; TAKE 1 TABLET (150 MG TOTAL) BY MOUTH DAILY.  Dispense: 90 tablet; Refill: 1    If protocol passes may refill for 12 months if within 3 months of last provider visit (or a total of 15 months).

## 2021-05-29 NOTE — PROGRESS NOTES
"[facility] Daily Progress     Patient Name: Lolis Gaines  Date: 6/12/2019  Visit #: 3  PTA visit #:  1  Referral Diagnosis: chronic pain in right knee  Referring provider: Clemente Barrios*  Visit Diagnosis:     ICD-10-CM    1. Chronic pain of right knee M25.561     G89.29    2. Generalized muscle weakness M62.81          Assessment:   Pt comes in and was hoping to be stronger by now. Educated patient that building strength takes about 6-8 weeks and today is only her 2nd follow up therapy session. It has only been 2 weeks since her initial evaluation. She reports compliance with her HEP but isn't icing and hasn't biked.     HEP/POC compliance is  fair .  Patient demonstrates understanding/independence with home program.  Patient is benefitting from skilled physical therapy and is making steady progress toward functional goals.  Patient is appropriate to continue with skilled physical therapy intervention, as indicated by initial plan of care.    Goal Status:  Pt. will demonstrate/verbalize independence in self-management of condition in : 12 weeks  Pt. will be independent with home exercise program in : 6 weeks  Pt. will be able to walk : 20 minutes;with less difficulty;for household mobility;for community mobility;with less pain;for exercise/recreation    Patient will increase : LEFS score;by _ points;for improved quality of function;for improved quality of life;in 12 weeks  by ___ points: 9      Plan / Patient Education:     Continue with initial plan of care.  Progress with home program as tolerated.     Exercises:  Exercise #1: clamshell x8 reps B  Comment #1: bridge 5\" x 13 reps  Exercise #2: SLR x 8 B  Exercise #3: TKE L2 band   Comment #3: 5\" x 8  Exercise #4: side plank with bent knees   Comment #4: to fatique (about 10\")  Exercise #5: monster walks w/ L1 until fatigue B    Plan for next session: stationary bike, mobs, add band to clamshell, change bridge to single leg    Subjective: " "    Pain Rating: \"pretty good today\"    Patient expects to be better with her exercises and with therapy by now. Not icing. Not biking at home. Was sore over the weekend from sanding, priming, and painting her front door over the weekend. She had to squat quite a bit.      Objective:   No pain w/ squat      Treatment Today    TREATMENT MINUTES COMMENTS   Evaluation     Self-care/ Home management     Manual therapy     Neuromuscular Re-education     Therapeutic Activity     Therapeutic Exercises 25 recumbant bike 5' L2. Progressed HEP- see flowsheet for details.     Performed in clinic:  -w/ 1# ankle weight- SLR, s/l hip abd, and hip ext until fatigue, 5\" holds  -single leg bridge until fatigue B   Gait training     Modality__________________                Total 25    Blank areas are intentional and mean the treatment did not include these items.       Maria Del Rosario Zavala, PT, DPT  6/12/2019    "

## 2021-05-29 NOTE — PROGRESS NOTES
"[facility] Daily Progress     Patient Name: Lolis Gaines  Date: 6/18/2019  Visit #: 4  PTA visit #:  1  Referral Diagnosis: chronic pain in right knee  Referring provider: Clemente Barrios*  Visit Diagnosis:     ICD-10-CM    1. Chronic pain of right knee M25.561     G89.29    2. Generalized muscle weakness M62.81          Assessment:   Pt reports feeling better and thinks her exercises are challenging. I'm going to have her work on her home program and follow up in 2-3 weeks. If she has a flare up before that time, she is encouraged to come into therapy sooner.    HEP/POC compliance is  good .  Patient demonstrates understanding/independence with home program.  Patient is benefitting from skilled physical therapy and is making steady progress toward functional goals.  Patient is appropriate to continue with skilled physical therapy intervention, as indicated by initial plan of care.    Goal Status:  Pt. will demonstrate/verbalize independence in self-management of condition in : 12 weeks  Pt. will be independent with home exercise program in : 6 weeks  Pt. will be able to walk : 20 minutes;with less difficulty;for household mobility;for community mobility;with less pain;for exercise/recreation    Patient will increase : LEFS score;by _ points;for improved quality of function;for improved quality of life;in 12 weeks  by ___ points: 9      Plan / Patient Education:     Continue with initial plan of care.  Progress with home program as tolerated.     Exercises:  Exercise #1: clamshell x8 reps B w/ L1  Comment #1: single leg bridge 5\" x 13 reps  Exercise #2: SLR x 8 B  Comment #2: single leg squat off of stair until fatigue  Exercise #3: TKE L2 band   Comment #3: 5\" x 8  Exercise #4: side plank with bent knees   Comment #4: to fatique (about 10\")  Exercise #5: monster walks w/ L1 until fatigue B    Plan for next session: stationary bike, mobs as needed. Goals.     Subjective:     Pain Rating: feels " "tender and swollen but doesn't have pain.     Feels like she's finally making progress. Hasn't been riding the bike but has been compliant with her HEP.      Objective:   No increase in pain w/ today's session  Response to MT: \"it feels like a massage\". No increase in pain      Treatment Today    TREATMENT MINUTES COMMENTS   Evaluation     Self-care/ Home management     Manual therapy 10 In seated position: Gr I-II femur/tib mobs to R knee- distraction,   Neuromuscular Re-education     Therapeutic Activity     Therapeutic Exercises 15 recumbant bike 5' L3. Progressed HEP- see flowsheet for details.    Gait training     Modality__________________                Total 25    Blank areas are intentional and mean the treatment did not include these items.       Maria Del Rosario Zavala, PT, DPT  6/18/2019    "

## 2021-05-29 NOTE — PROGRESS NOTES
[facility]   Knee Initial Evaluation    Patient Name: Lolis Gaines  Date of evaluation: 5/28/2019  Referral Diagnosis: chronic pain of right knee  Referring provider: Clemente Barrios*  Visit Diagnosis:     ICD-10-CM    1. Chronic pain of right knee M25.561     G89.29    2. Generalized muscle weakness M62.81        Past Medical History:   Diagnosis Date     Abnormal weight loss      Allergic rhinitis      Esophageal reflux      Family history of colon cancer      Fibrocystic breast      Fibromyalgia      Hyperlipemia      Hypothyroidism      Neoplasm      Ovarian cyst        Assessment:      Patient is 11' late to appt    Lolis Gaines is a 48 y.o. female who presents to therapy today with chief complaints of chronic right knee pain. Symptoms began years ago without specific injury.  Difficulty with transfers, stairs, lifting, walking a lot, standing 30 minutes, walking 15 minutes due to pain.  Pain symptoms are getting worse.  Patient demonstrates signs and sx consistent with possibly patellofemoral pain and weakness. PT POC and goals have been discussed with patient and She  is agreeable to these. Patient appears motivated for physical therapy and is appropriate for skilled therapy services.    The POC is dynamic and will be modified on an ongoing basis.  Barriers to achieving goals as noted in the assessment section may affect outcome.  Prognosis to achieve goals is  good   Pt. is appropriate for skilled PT intervention as outlined in the Plan of Care (POC).  Pt. is a good candidate for skilled PT services to improve pain levels and function.      Goals:  Pt. will demonstrate/verbalize independence in self-management of condition in : 12 weeks  Pt. will be independent with home exercise program in : 6 weeks  Pt. will be able to walk : 20 minutes;with less difficulty;for household mobility;for community mobility;with less pain;for exercise/recreation    Patient will increase : LEFS score;by _  "points;for improved quality of function;for improved quality of life;in 12 weeks  by ___ points: 9      Goals and plan of care were set in collaboration with the patient.    Patient's expectations/goals are realistic.    Barriers to Learning or Achieving Goals:  Chronicity of the problem.  Co-morbidities or other medical factors.  see [facility]       Plan / Patient Instructions:      Plan of Care:   Communication with: Referral Source  Patient Related Instruction: Nature of Condition;Next steps;Expected outcome;Treatment plan and rationale;Self Care instruction;Basis of treatment;Body mechanics;Posture;Precautions  Times per Week: 1-2  Number of Weeks: 10-12  Number of Visits: 10  Discharge Planning: when PT goals are met  Therapeutic Exercise: ROM;Stretching;Strengthening  Neuromuscular Reeducation: core;posture;balance/proprioception  Manual Therapy: soft tissue mobilization;myofascial release;joint mobilization;muscle energy  Equipment: theraband      Exercises:  Exercise #1: clamshell until fatigue B  Comment #1: bridge, 5-10\" holds, until fatigue  Exercise #2: SLR 5-10\" holds until fatigue B      Treatment techniques, plan of care, and goals were discussed with the patient. The patient agrees to the plan as outlined. The plan of care is dynamic and will be modified on an ongoing basis.    Plan for next visit: bicycle, TKE, add band to clamshell, KT, side plank from knees, monster walk     Subjective:        Social information:   Living Situation: with family   Occupation: stay at home mom   Hobbies: shopping, house work, help out family, playing games on I-pad    History of Present Illness:    Lolis is a 48 y.o. female who presents to therapy today with complaints of chronic intermittent right knee pain/hyperextension. 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  Onset was gradual. Symptoms are intermittent and getting worse. Pain is superior of R patella and " surrounding her knee cap. She has had similar symptoms in her left knee a few years ago and had a good response to physical therapy.  Aleve and ice help.     Pain Ratin  Pain rating at best: 3  Pain rating at worst: 9    Functional limitations are described as occurring with: transfers, stairs, lifting, walking a lot, standing 30 minutes, walking 15 minutes       Objective:      Note: Items left blank indicates the item was not performed or not indicated at the time of the evaluation.    Patient Outcome Measures :    Lower Extremity Functional Scale (_/80): 40     Scores range from 0-80, where a score of 80 represents maximum function. The minimal clinically important difference is a positive change of 9 points.    Knee Examination  1. Chronic pain of right knee     2. Generalized muscle weakness       Precautions/Restrictions:  None  Involved Side: Right  Assistive Device: None  Gait Observation: normal  Hip Clearing: Does not provoke symptoms    Knee ROM         AROM in degrees  Right   Left  Right   Left  Right   Left       Knee Flexion  (130 )   140   140                   Knee Extension  (0 )   0   0                 PROM in degrees  Right   Left  Right   Left  Right   Left       Knee Flexion  (130 )                         Knee Extension  (0 )                       LE Strength         5/5 B, but she fatigues easily with new exercises given today   Strength (MMT/5)  Right   Left  Right   Left  Right   Left       Hip Flexion                         Hip Abduction                         Hip Adduction                         Hip Extension                         Hip Internal Rotation                         Hip External Rotation                         Knee Extension                         Knee Flexion                         Ankle Dorsiflexion                         Ankle Plantarflexion                       Flexibility:  Negative hypermobility screen in knees, thumbs, and elbows    Palpation:  No tenderness  w/ palpation    Knee Special Tests (+/-):      Knee OA Cluster   Right   Left   Ligament Tests   Right   Left    1. > 51 y/o           Lachman   -       2. Stiffness > 30 min.           Anterior Drawer   -       3. Crepitus           Posterior Drawer          4. Bony tenderness           Posterior Sag   -       5. Bone enlargement           Valgus Stress   -       6. No warmth to the touch           Varus Stress   -        Meniscal Tests   Right   Left    Other   Right    Left       La's           Ely's             Joint line tenderness           Latasha             Thessaly Thomas Apley's                          Treatment Today     TREATMENT MINUTES COMMENTS   Evaluation 14 -knee pain   Self-care/ Home management     Manual therapy     Neuromuscular Re-education     Therapeutic Activity     Therapeutic Exercises 23 Discussed PT POC and pathology of condition. Answered patient questions. Began HEP-see flowsheet. Recommend patient ice as needed.   Gait training     Modality__________________                Total 37    Blank areas are intentional and mean the treatment did not include these items.     PT Evaluation Code: (Please list factors)  Patient History/Comorbidities: see PMH  Examination: chronic R knee pain  Clinical Presentation: stable  Clinical Decision Making: low    Patient History/  Comorbidities Examination  (body structures and functions, activity limitations, and/or participation restrictions) Clinical Presentation Clinical Decision Making (Complexity)   No documented Comorbidities or personal factors 1-2 Elements Stable and/or uncomplicated Low   1-2 documented comorbidities or personal factor 3 Elements Evolving clinical presentation with changing characteristics Moderate   3-4 documented comorbidities or personal factors 4 or more Unstable and unpredictable High Maria Del Rosario Zavala, PT, DPT  5/28/2019  10:08 AM

## 2021-05-29 NOTE — PROGRESS NOTES
"[facility] Daily Progress     Patient Name: Lolis Gaines  Date: 2019  Visit #: 2  PTA visit #:  1  Referral Diagnosis: [unfilled]  Referring provider: Clemente Barrios*  Visit Diagnosis:     ICD-10-CM    1. Chronic pain of right knee M25.561     G89.29    2. Generalized muscle weakness M62.81          Assessment:   Pt returns today for her first follow up appointment.  Pt demonstrates weak LE musculature R>L. Pt fatiques with 7-8 reps with SLRs and clamshells.  Patient is benefitting from skilled physical therapy and is making steady progress toward functional goals.  Patient is appropriate to continue with skilled physical therapy intervention, as indicated by initial plan of care.    Goal Status:  Pt. will demonstrate/verbalize independence in self-management of condition in : 12 weeks  Pt. will be independent with home exercise program in : 6 weeks  Pt. will be able to walk : 20 minutes;with less difficulty;for household mobility;for community mobility;with less pain;for exercise/recreation    Patient will increase : LEFS score;by _ points;for improved quality of function;for improved quality of life;in 12 weeks  by ___ points: 9      Plan / Patient Education:     Continue with initial plan of care.  Progress with home program as tolerated.  Pt to continue to gradually increase reps.   Pt will try riding her stationary starting with 5 minutes and gradually increasing time.     Subjective:   Pt states her knee is doing\"ok.\" Pt states she thought she would be doing better by now.  Pt has been compliant with her HEP.  Pt states she has a Pel[employer] bike (stationary bike) at home.  Pain Ratin      Objective:   R knee ROM: 1 degree of hyperextension-138 degrees of flexion    Exercises:  Exercise #1: clamshell x8 reps B  Comment #1: bridge 5\" x 13 reps  Exercise #2: SLR x 8 B  Exercise #3: TKE L2 band   Comment #3: 5\" x 8  Exercise #4: side plank with bent knees   Comment #4: to fatique " "(about 10\")      Treatment Today    TREATMENT MINUTES COMMENTS   Evaluation     Self-care/ Home management     Manual therapy     Neuromuscular Re-education     Therapeutic Activity     Therapeutic Exercises 30 Upright bike SH@2 WL2 x 5'  See flow sheet  added to HEP:  -TKE with L2 band   -side plank with bent knees   Gait training     Modality__________________                Total 30    Blank areas are intentional and mean the treatment did not include these items.       Angelia Pierson PTA,CLT  6/4/2019    "

## 2021-05-30 NOTE — PROGRESS NOTES
"Assessment/Plan:     Problem List Items Addressed This Visit     Rosacea     Failure of topicals for all symptoms.  As such will restart spironolactone as previous.         Relevant Medications    spironolactone (ALDACTONE) 50 MG tablet      Other Visit Diagnoses     Tick bite, initial encounter    -  Primary    Given the endemic area, the length of time that it was attached, patient does fit qualifications for prophylactic therapy.  Side effects precautions discussed.    Relevant Medications    doxycycline (VIBRAMYCIN) 100 MG capsule        Return in about 1 month (around 8/12/2019) for Annual physical.    Subjective:   48 y.o. female presents for tick bite.  Patient was on the fields on Saturday to pick just underneath her right shoulder blade.  It was engorged.  Denies any fevers chills or joint aches.  Has not been infected with Lyme's previously.  She does living in endemic area.  Also her rosacea has been acting up.  She has tried MetroGel as well as Retin-A and it helps some but does not work as well as spironolactone has previously.  She understands the side effects and risks with the spironolactone.        Review of Systems   All other systems reviewed and are negative.       History     Reviewed By Date/Time Sections Reviewed    Clemente Barrios,  7/10/2019  2:19 PM Medical, Surgical, Tobacco, Alcohol, Drug Use, Sexual Activity, Family    Alexis Preciado Jr., [facility] 7/10/2019  1:53 PM Tobacco           Objective:     Vitals:    07/10/19 1349   BP: 98/62   Pulse: 64   Resp: 12   Temp: 98.5  F (36.9  C)   TempSrc: Oral   Weight: 120 lb 4.8 oz (54.6 kg)   Height: 5' 3\" (1.6 m)     Physical Exam   Constitutional: She is oriented to person, place, and time. She appears well-developed and well-nourished.   HENT:   Head: Normocephalic and atraumatic.   Cardiovascular: Normal rate, regular rhythm, normal heart sounds and intact distal pulses. Exam reveals no friction rub.   No murmur " heard.  Pulmonary/Chest: Effort normal and breath sounds normal. She has no wheezes. She has no rales.   Musculoskeletal: She exhibits no edema.   Neurological: She is alert and oriented to person, place, and time.   Skin: Capillary refill takes less than 2 seconds. No rash noted. No erythema.   Psychiatric: She has a normal mood and affect.   Nursing note and vitals reviewed.      This note has been dictated using voice recognition software. Any grammatical or context distortions are unintentional and inherent to the software

## 2021-05-30 NOTE — PROGRESS NOTES
"[facility] Daily Progress     Patient Name: Lolis Gaines  Date: 7/2/2019  Visit #: 5  PTA visit #:  1  Referral Diagnosis: chronic pain in right knee  Referring provider: Clemente Barrios*  Visit Diagnosis:     ICD-10-CM    1. Chronic pain of right knee M25.561     G89.29    2. Generalized muscle weakness M62.81          Assessment:   Pt had a flare up a few days ago after lifting some heavy boxes. She is doing better now but still achy. Reviewed lifting mechanics today to put less stress/strain on her knees and her back, which should help decrease/prevent future flare ups. She feels about 50% better since beginning PT.    HEP/POC compliance is  good .  Patient demonstrates understanding/independence with home program.  Patient is benefitting from skilled physical therapy and is making steady progress toward functional goals.  Patient is appropriate to continue with skilled physical therapy intervention, as indicated by initial plan of care.    Goal Status (progressing towards):  Pt. will demonstrate/verbalize independence in self-management of condition in : 12 weeks  Pt. will be independent with home exercise program in : 6 weeks  Pt. will be able to walk : 20 minutes;with less difficulty;for household mobility;for community mobility;with less pain;for exercise/recreation    Patient will increase : LEFS score;by _ points;for improved quality of function;for improved quality of life;in 12 weeks  by ___ points: 9      Plan / Patient Education:     Continue with initial plan of care.  Progress with home program as tolerated.     Exercises:  Exercise #1: clamshell until fatigue B w/ L1  Comment #1: single leg bridge 5\" x 13 reps  Exercise #2: SLR x 8 B  Comment #2: single leg squat off of stair until fatigue  Exercise #3: TKE L2 band   Comment #3: 5\" x 8  Exercise #4: side plank with bent knees   Comment #4: to fatique (about 10\")  Exercise #5: monster walks w/ L1 until fatigue B    Plan for next " "session: stationary bike, mobs as needed. Give harder bands if she's ready. Try reverse lunges if she wants more exercises. Goals.     Subjective:     Pain Rating: feels tender and swollen but doesn't have pain.     Moved some heavy boxes over the weekend and had a flare up. She iced after. A little achy now. Hasn't had to take Aleve despite having a flare up. Feels about 50% progress since beginning PT. Feeling stronger since beginning therapy.       Objective:   No increase in pain w/ today's session  Response to MT: \"it feels like a massage\". No increase in pain  Mod cueing for good technique with lifting and single leg squat off of step      Treatment Today    TREATMENT MINUTES COMMENTS   Evaluation     Self-care/ Home management     Manual therapy 10 In seated position: Gr I-II femur/tib mobs to R knee- distraction, PA/AP glides, and circles.    Neuromuscular Re-education     Therapeutic Activity 5 Reviewed good lifting techniques   Therapeutic Exercises 10 recumbant bike 5' L3. Reviewed single leg squat off of step and discussed importance of butt going back with squatting-type of activities.   Gait training     Modality__________________                Total 25    Blank areas are intentional and mean the treatment did not include these items.       Maria Del Rosario Zavala, PT, DPT  7/2/2019    "

## 2021-05-30 NOTE — PROGRESS NOTES
"[facility] Daily Progress/discharge summary     Patient Name: Lolis Gaines  Date: 7/16/2019  Visit #: 6  PTA visit #:  1  Referral Diagnosis: chronic pain in right knee  Referring provider: Clemente Barrios*  Visit Diagnosis:     ICD-10-CM    1. Chronic pain of right knee M25.561     G89.29    2. Generalized muscle weakness M62.81          Assessment:   Patient is feeling about 70% better since beginning therapy and feels that she's on the right track with her knee pain. Patient has been compliant with attending physical therapy sessions scheduled and is ready to trial independent management of her condition. If she does not return within 30 days, she will be discharged and will need a new order to resume in the future. She is agreeable to this plan.    HEP/POC compliance is  good .  Patient demonstrates understanding/independence with home program.  Patient is benefitting from skilled physical therapy and is making steady progress toward functional goals.  Patient is appropriate to continue with skilled physical therapy intervention, as indicated by initial plan of care.    Goal Status:  Pt. will demonstrate/verbalize independence in self-management of condition in : 12 weeks;Met  Pt. will be independent with home exercise program in : 6 weeks;Met  Pt. will be able to walk : 20 minutes;with less difficulty;for household mobility;for community mobility;with less pain;for exercise/recreation;Met    Patient will increase : LEFS score;by _ points;for improved quality of function;for improved quality of life;in 12 weeks;Met  by ___ points: 9      Plan / Patient Education:     Continue with initial plan of care.  Progress with home program as tolerated.     Exercises:  Exercise #1: clamshell until fatigue B w/ L1 ((gave L2 band for progression at home))  Comment #1: single leg bridge 5\" x 13 reps  Exercise #2: SLR x 8 B  Comment #2: single leg squat off of stair until fatigue  Exercise #3: TKE L3 " "band   Comment #3: 5\" x 8  Exercise #4: side plank with bent knees   Comment #4: to jing (about 10\")  Exercise #5: monster walks w/ L1 until fatigue B ((gave L2 band for progression at home))    Plan for next session: Hold chart open for 30 days.    Subjective:     Pain Rating: feels tender and swollen but doesn't have pain.     Achy all over today. Noticed that she isn't lifting properly at home after reviewing lifting techniques last session. Feeling about 70% progress towards goals for coming to therapy. Knows she's on the right track. Feels comfortable with her HEP. Not doing TKE. Didn't need to ice after [place] the airport.      Objective:   No increase in pain w/ today's session  Response to MT: no increase in pain. \"it feels good!\"      Treatment Today    TREATMENT MINUTES COMMENTS   Evaluation     Self-care/ Home management     Manual therapy 14 In seated position: Gr I-II femur/tib mobs to R knee- distraction, PA/AP glides, and circles.    Neuromuscular Re-education     Therapeutic Activity     Therapeutic Exercises 14 recumbant bike 5' L3. Reviewed TKE and progressed HEP. Had patient fill out LEFS. Answered patient questions/concerns. Discussed goals, progress, and discharge plans.   Gait training     Modality__________________                Total 28    Blank areas are intentional and mean the treatment did not include these items.       Maria Del Rosario Zavala, PT, DPT  7/16/2019    "

## 2021-05-31 VITALS — WEIGHT: 117.8 LBS | BODY MASS INDEX: 20.87 KG/M2 | HEIGHT: 63 IN

## 2021-05-31 VITALS — WEIGHT: 124.3 LBS | HEIGHT: 63 IN | BODY MASS INDEX: 22.02 KG/M2

## 2021-05-31 NOTE — PROGRESS NOTES
The patient was counseled and encouraged to consider modifying their diet and eating habits. She was provided with information on recommended healthy diet options.

## 2021-06-01 VITALS — WEIGHT: 123.7 LBS | HEIGHT: 63 IN | BODY MASS INDEX: 21.92 KG/M2

## 2021-06-02 VITALS — BODY MASS INDEX: 20.82 KG/M2 | HEIGHT: 63 IN | WEIGHT: 117.5 LBS

## 2021-06-02 VITALS — WEIGHT: 118.25 LBS | BODY MASS INDEX: 20.95 KG/M2

## 2021-06-02 VITALS — WEIGHT: 118.9 LBS | BODY MASS INDEX: 21.07 KG/M2 | HEIGHT: 63 IN

## 2021-06-03 VITALS — WEIGHT: 120.3 LBS | BODY MASS INDEX: 21.32 KG/M2 | HEIGHT: 63 IN

## 2021-06-03 VITALS — WEIGHT: 119.3 LBS | HEIGHT: 63 IN | BODY MASS INDEX: 21.14 KG/M2

## 2021-06-04 VITALS
DIASTOLIC BLOOD PRESSURE: 72 MMHG | SYSTOLIC BLOOD PRESSURE: 112 MMHG | HEART RATE: 64 BPM | HEIGHT: 63 IN | WEIGHT: 117.2 LBS | TEMPERATURE: 98.4 F | RESPIRATION RATE: 16 BRPM | BODY MASS INDEX: 20.77 KG/M2

## 2021-06-04 VITALS
RESPIRATION RATE: 16 BRPM | WEIGHT: 117.9 LBS | DIASTOLIC BLOOD PRESSURE: 70 MMHG | BODY MASS INDEX: 20.89 KG/M2 | HEIGHT: 63 IN | TEMPERATURE: 97.9 F | SYSTOLIC BLOOD PRESSURE: 112 MMHG | HEART RATE: 84 BPM

## 2021-06-04 VITALS
RESPIRATION RATE: 12 BRPM | BODY MASS INDEX: 20.66 KG/M2 | HEIGHT: 63 IN | SYSTOLIC BLOOD PRESSURE: 110 MMHG | HEART RATE: 94 BPM | WEIGHT: 116.6 LBS | TEMPERATURE: 97.2 F | DIASTOLIC BLOOD PRESSURE: 64 MMHG

## 2021-06-05 VITALS
DIASTOLIC BLOOD PRESSURE: 60 MMHG | BODY MASS INDEX: 20.64 KG/M2 | SYSTOLIC BLOOD PRESSURE: 98 MMHG | HEART RATE: 92 BPM | WEIGHT: 116.5 LBS

## 2021-06-05 NOTE — TELEPHONE ENCOUNTER
Refill Approved    Rx renewed per Medication Renewal Policy. Medication was last renewed on 1/22/19.    Aniyah Alcala, [facility] Triage/Med Refill 1/14/2020     Requested Prescriptions   Pending Prescriptions Disp Refills     levothyroxine (SYNTHROID, LEVOTHROID) 100 MCG tablet [Pharmacy Med Name: LEVOTHYROXINE 100 MCG TABLET] 90 tablet 3     Sig: TAKE 1 TABLET BY MOUTH EVERY DAY       Thyroid Hormones Protocol Passed - 1/11/2020 11:32 AM        Passed - Provider visit in past 12 months or next 3 months     Last office visit with prescriber/PCP: 7/10/2019 Clemente Barrios DO OR same dept: 7/10/2019 Clemente Barrios DO OR same specialty: 7/10/2019 Clemente Barrios DO  Last physical: 7/31/2019 Last MTM visit: Visit date not found   Next visit within 3 mo: Visit date not found  Next physical within 3 mo: Visit date not found  Prescriber OR PCP: Clemente Barrios DO  Last diagnosis associated with med order: 1. Hypothyroidism  - levothyroxine (SYNTHROID, LEVOTHROID) 100 MCG tablet [Pharmacy Med Name: LEVOTHYROXINE 100 MCG TABLET]; TAKE 1 TABLET BY MOUTH EVERY DAY  Dispense: 90 tablet; Refill: 3    2. Hyperlipidemia  - simvastatin (ZOCOR) 40 MG tablet [Pharmacy Med Name: SIMVASTATIN 40 MG TABLET]; TAKE 1 TABLET (40 MG TOTAL) BY MOUTH AT BEDTIME.  Dispense: 90 tablet; Refill: 3    If protocol passes may refill for 12 months if within 3 months of last provider visit (or a total of 15 months).             Passed - TSH on file in past 12 months for patient age 12 & older     TSH   Date Value Ref Range Status   07/31/2019 1.82 0.30 - 5.00 uIU/mL Final                   simvastatin (ZOCOR) 40 MG tablet [Pharmacy Med Name: SIMVASTATIN 40 MG TABLET] 90 tablet 3     Sig: TAKE 1 TABLET (40 MG TOTAL) BY MOUTH AT BEDTIME.       Statins Refill Protocol (Hmg CoA Reductase Inhibitors) Passed - 1/11/2020 11:32 AM        Passed - PCP or prescribing provider visit in past 12 months      Last  office visit with prescriber/PCP: 7/10/2019 Clemente Barrios DO OR same dept: 7/10/2019 Clemente Barrios DO OR same specialty: 7/10/2019 Clemente Barrios DO  Last physical: 7/31/2019 Last MTM visit: Visit date not found   Next visit within 3 mo: Visit date not found  Next physical within 3 mo: Visit date not found  Prescriber OR PCP: Clemente Barrios DO  Last diagnosis associated with med order: 1. Hypothyroidism  - levothyroxine (SYNTHROID, LEVOTHROID) 100 MCG tablet [Pharmacy Med Name: LEVOTHYROXINE 100 MCG TABLET]; TAKE 1 TABLET BY MOUTH EVERY DAY  Dispense: 90 tablet; Refill: 3    2. Hyperlipidemia  - simvastatin (ZOCOR) 40 MG tablet [Pharmacy Med Name: SIMVASTATIN 40 MG TABLET]; TAKE 1 TABLET (40 MG TOTAL) BY MOUTH AT BEDTIME.  Dispense: 90 tablet; Refill: 3    If protocol passes may refill for 12 months if within 3 months of last provider visit (or a total of 15 months).

## 2021-06-05 NOTE — TELEPHONE ENCOUNTER
"Question following Office Visit  When did you see your provider: 1/27/20  What is your question: Patient has increased her buproprion  mg, to 2 tablets daily.  She has done this for about 3 or 4 days and complains that with the increase of medication she feels \"very foggy and off.\"  Will this improve with time?  How long should she give it?  Okay to leave a detailed message: Yes    "

## 2021-06-05 NOTE — TELEPHONE ENCOUNTER
Reason contacted:  Rx advice  Information relayed:  As per PCP note below.  Additional questions:  No  Further follow-up needed:  No  Okay to leave a detailed message:  Yes

## 2021-06-05 NOTE — PROGRESS NOTES
Assessment/Plan:     Problem List Items Addressed This Visit     Fibromyalgia     Diet and exercise discussed.  Counseling services through work also discussed.  If those are not avenues for her, she will contact me so I can put in referral for counseling specifically.  Also will check labs as per orders.  If TSH and CBC are normal range then would look at increasing Wellbutrin to 300 mg for the next 2 to 3 months during adjustment.  And then come back down to the 150.         Hypothyroidism - Primary     Recheck TSH levels and adjust the levothyroxine 100 mcg dosing as needed         Relevant Orders    Thyroid Cascade    Other fatigue     See treatment plan for fibromyalgia         Relevant Orders    HM2(CBC w/o Differential) (Completed)        Return in about 6 months (around 7/27/2020) for Annual physical, anxiety.    Subjective:   49 y.o. female presents for follow-up on thyroid, fibromyalgia, fatigue.  Her  and her ended up not going to counseling.  She does have her daughter in counseling.  Patient is doing okay at the new job.  Still resentful and that she is had to go back to work.  Her  did find a new job which she held for 2 weeks and was again fired.  She is also worried and that his alcohol intake has increased.  She still feels safe at home.  Does not feel physically or mentally violated or at risk.  Work is okay.  She is feeling like she is a bit behind her other counterparts but she is slowly catching up.  She would like to catch up faster and feels a little frustrated and that is taking her time.      The following are part of a depression follow up plan for the patient:  coping support assessment      Review of Systems   Constitutional: Positive for fatigue. Negative for chills and fever.   Respiratory: Negative for cough, choking and shortness of breath.    Cardiovascular: Negative for chest pain, palpitations and leg swelling.   Neurological: Negative for syncope and  "light-headedness.   Psychiatric/Behavioral: Negative for behavioral problems, self-injury, sleep disturbance and suicidal ideas. The patient is nervous/anxious.         History     Reviewed By Date/Time Sections Reviewed    Clemente Barrios,  1/27/2020 10:50 AM Medical, Surgical, Tobacco, Family, Socioeconomic    Ilana, Alexis LAGUERRE Jr., [facility] 1/27/2020 10:35 AM Tobacco           Objective:     Vitals:    01/27/20 1031   BP: 112/70   Pulse: 84   Resp: 16   Temp: 97.9  F (36.6  C)   TempSrc: Oral   Weight: 117 lb 14.4 oz (53.5 kg)   Height: 5' 3\" (1.6 m)     Physical Exam  Vitals signs and nursing note reviewed.   Constitutional:       General: She is not in acute distress.     Appearance: Normal appearance.   HENT:      Head: Normocephalic and atraumatic.   Cardiovascular:      Rate and Rhythm: Normal rate and regular rhythm.      Pulses: Normal pulses.      Heart sounds: Normal heart sounds.   Pulmonary:      Effort: Pulmonary effort is normal.      Breath sounds: Normal breath sounds.   Musculoskeletal:      Right lower leg: No edema.      Left lower leg: No edema.   Skin:     Capillary Refill: Capillary refill takes less than 2 seconds.   Neurological:      Mental Status: She is alert and oriented to person, place, and time.   Psychiatric:         Mood and Affect: Mood normal.         This note has been dictated using voice recognition software. Any grammatical or context distortions are unintentional and inherent to the software      "

## 2021-06-05 NOTE — TELEPHONE ENCOUNTER
Let's give it a total of 10 days. If still feeling foggy at that time then go back to 1 tab daily, let me know, and will discuss other options if needed. I do expect that in the next 2-3 days it will start to improve and be fully resolved by day 10.

## 2021-06-06 NOTE — PROGRESS NOTES
Assessment/Plan:     Problem List Items Addressed This Visit     Fibromyalgia - Primary     Add in sertraline at 50 mg daily.  Side effects precautions discussed.  Safety plan reviewed.  Continue with counseling.         Relevant Medications    buPROPion (WELLBUTRIN XL) 150 MG 24 hr tablet    sertraline (ZOLOFT) 50 MG tablet      Other Visit Diagnoses     Fatigue        Relevant Medications    buPROPion (WELLBUTRIN XL) 150 MG 24 hr tablet    sertraline (ZOLOFT) 50 MG tablet        Return in about 3 months (around 6/4/2020) for anxiety.    Subjective:   49 y.o. female presents for follow-up on anxiety and depression.  Actually improved considerably when she went up to 300 mg.  She was no longer crying every day and felt more in control and confident.  However she developed ringing in her bilateral ears that was constant and annoying.  She missed her dose 1 day and the ringing went away.  As such she went back down to 150 mg.  With that the ear ringing has stayed away, but now having daily crying spells and not feeling as competent as she did before.  Denies any homicidal or suicidal ideation or thoughts of harming self or others.  Work is going well.  Her family did start with family counseling and individual counseling with an intake last week and now will be starting individual appointments.      The following are part of a depression follow up plan for the patient:  under care of mental health counselor      Review of Systems   Constitutional: Negative for appetite change and unexpected weight change.   Eyes: Negative for visual disturbance.   Cardiovascular: Negative for chest pain.   Gastrointestinal: Negative for constipation and diarrhea.   Neurological: Negative for dizziness, tremors and light-headedness.   Psychiatric/Behavioral: Positive for decreased concentration and dysphoric mood. Negative for behavioral problems, confusion, self-injury, sleep disturbance and suicidal ideas. The patient is  "nervous/anxious. The patient is not hyperactive.         History     Reviewed By Date/Time Sections Reviewed    Denys Barriosrobertdomo Drew,  3/4/2020 10:34 AM Medical, Surgical, Tobacco, Family, Socioeconomic    Alexis Preciado Jr., [facility] 3/4/2020 10:30 AM Tobacco    Alexis Preciado Jr., [facility] 3/4/2020 10:26 AM Tobacco           Objective:     Vitals:    03/04/20 1025   BP: 112/72   Pulse: 64   Resp: 16   Temp: 98.4  F (36.9  C)   TempSrc: Oral   Weight: 117 lb 3.2 oz (53.2 kg)   Height: 5' 3\" (1.6 m)     Physical Exam  Vitals signs and nursing note reviewed.   Constitutional:       Appearance: She is well-developed.   HENT:      Head: Normocephalic and atraumatic.   Neurological:      Mental Status: She is alert and oriented to person, place, and time.   Psychiatric:         Mood and Affect: Mood normal.         Thought Content: Thought content normal.         This note has been dictated using voice recognition software. Any grammatical or context distortions are unintentional and inherent to the software      "

## 2021-06-07 NOTE — TELEPHONE ENCOUNTER
Refill Approved    Rx renewed per Medication Renewal Policy. Medication was last renewed on 3.4.20.    Aleah Pascual, [facility] Triage/Med Refill 4/13/2020     Requested Prescriptions   Pending Prescriptions Disp Refills     ranitidine (ZANTAC) 150 MG tablet [Pharmacy Med Name: RANITIDINE 150 MG TABLET] 180 tablet 3     Sig: TAKE 1 TABLET (150 MG TOTAL) BY MOUTH 2 (TWO) TIMES A DAY.       GI Medications Refill Protocol Passed - 4/13/2020 12:24 AM        Passed - PCP or prescribing provider visit in last 12 or next 3 months.     Last office visit with prescriber/PCP: 3/4/2020 Clemente Barrios DO OR same dept: 3/4/2020 Clemente Barrios DO OR same specialty: 3/4/2020 Clemente Barrios DO  Last physical: 7/31/2019 Last MTM visit: Visit date not found   Next visit within 3 mo: Visit date not found  Next physical within 3 mo: Visit date not found  Prescriber OR PCP: Clemente Barrios DO  Last diagnosis associated with med order: 1. Chest pain  - ranitidine (ZANTAC) 150 MG tablet [Pharmacy Med Name: RANITIDINE 150 MG TABLET]; TAKE 1 TABLET (150 MG TOTAL) BY MOUTH 2 (TWO) TIMES A DAY.  Dispense: 180 tablet; Refill: 3    If protocol passes may refill for 12 months if within 3 months of last provider visit (or a total of 15 months).

## 2021-06-07 NOTE — TELEPHONE ENCOUNTER
Pain started in the arm pit.    Pain is now radiating into left arm down to the elbow and involves the side of her breast.    This pain started months ago but in the past 2 weeks the pain has gotten much worse.    Rates her pain at a 6-7 out of 10    It is a burning pain. The pain is constant. At first the pain would come and go. It was a gradual onset.     It also feels sore and swollen    No work or exercise that could have provoked the pain    No neck pain    Patient doesn't think that the arm is swollen but the patient's daughter thought that the arm might be swollen    No rash    No fever    No numbness    No tingling    No weakness    The pain doesn't awaken her from sleep    Breathing is fine    No chest pain but is having back pain    Advised ER evaluation per protocol.    Merary Jay RN   [facility] Medication Refill and Triage Nurse  10:29 AM  3/21/2020      Reason for Disposition    [1] Age > 40 AND [2] no obvious cause AND [3] pain even when not moving the arm    (Exception: pain is clearly made worse by moving arm or bending neck)    Protocols used: ARM PAIN-A-

## 2021-06-07 NOTE — TELEPHONE ENCOUNTER
Refill Approved    Rx renewed per Medication Renewal Policy. Medication was last renewed on 7/10/19.    Aniyah Alcala, [facility] Triage/Med Refill 4/14/2020     Requested Prescriptions   Pending Prescriptions Disp Refills     spironolactone (ALDACTONE) 50 MG tablet [Pharmacy Med Name: SPIRONOLACTONE 50 MG TABLET] 90 tablet 2     Sig: TAKE 1 TABLET BY MOUTH EVERY DAY       Diuretics/Combination Diuretics Refill Protocol  Passed - 4/14/2020 12:32 AM        Passed - Visit with PCP or prescribing provider visit in past 12 months     Last office visit with prescriber/PCP: 3/4/2020 Clemente Barrios DO OR same dept: 3/4/2020 Clemente Barrios DO OR same specialty: 3/4/2020 Clemente Barrios DO  Last physical: 7/31/2019 Last MTM visit: Visit date not found   Next visit within 3 mo: Visit date not found  Next physical within 3 mo: Visit date not found  Prescriber OR PCP: Clemente Barrios DO  Last diagnosis associated with med order: 1. Rosacea  - spironolactone (ALDACTONE) 50 MG tablet [Pharmacy Med Name: SPIRONOLACTONE 50 MG TABLET]; TAKE 1 TABLET BY MOUTH EVERY DAY  Dispense: 90 tablet; Refill: 2    If protocol passes may refill for 12 months if within 3 months of last provider visit (or a total of 15 months).             Passed - Serum Potassium in past 12 months      Lab Results   Component Value Date    Potassium 4.3 07/31/2019             Passed - Serum Sodium in past 12 months      Lab Results   Component Value Date    Sodium 138 07/31/2019             Passed - Blood pressure on file in past 12 months     BP Readings from Last 1 Encounters:   03/04/20 112/72             Passed - Serum Creatinine in past 12 months      Creatinine   Date Value Ref Range Status   07/31/2019 0.98 0.60 - 1.10 mg/dL Final

## 2021-06-08 NOTE — TELEPHONE ENCOUNTER
Refill Approved    Rx renewed per Medication Renewal Policy. Medication was last renewed on 1/14/20.    Aniyah Alcala, [facility] Triage/Med Refill 5/27/2020     Requested Prescriptions   Pending Prescriptions Disp Refills     simvastatin (ZOCOR) 40 MG tablet [Pharmacy Med Name: SIMVASTATIN 40 MG TABLET] 90 tablet 1     Sig: TAKE 1 TABLET (40 MG TOTAL) BY MOUTH AT BEDTIME.       Statins Refill Protocol (Hmg CoA Reductase Inhibitors) Passed - 5/24/2020  2:57 PM        Passed - PCP or prescribing provider visit in past 12 months      Last office visit with prescriber/PCP: 3/4/2020 Clemente Barrios DO OR same dept: 3/4/2020 Clemente Barrios DO OR same specialty: 3/4/2020 Clemente Barrios DO  Last physical: 7/31/2019 Last MTM visit: Visit date not found   Next visit within 3 mo: Visit date not found  Next physical within 3 mo: Visit date not found  Prescriber OR PCP: Clemente Barrios DO  Last diagnosis associated with med order: 1. Hyperlipidemia  - simvastatin (ZOCOR) 40 MG tablet [Pharmacy Med Name: SIMVASTATIN 40 MG TABLET]; TAKE 1 TABLET (40 MG TOTAL) BY MOUTH AT BEDTIME.  Dispense: 90 tablet; Refill: 1    If protocol passes may refill for 12 months if within 3 months of last provider visit (or a total of 15 months).

## 2021-06-09 NOTE — PROGRESS NOTES
Assessment/Plan:     Problem List Items Addressed This Visit     Hypothyroidism     Recheck level today with other lab draw.  Patient has been stable and levothyroxine at 100 mcg daily.         Relevant Orders    Thyroid Cascade    HM2(CBC w/o Differential) (Completed)    Myalgia - Primary     Given the tick bite, timing of symptoms, headache, and fatigue will start on doxycycline for 10-day cycle.  Will check for Lyme's cascade.  If that is positive will extend doxycycline treatment to a total of 21 days.    Given her work environment and possible exposure greater than 14 days ago will get serology testing for COVID         Relevant Medications    doxycycline (VIBRA-TABS) 100 MG tablet    Other Relevant Orders    Lyme Antibody Cascade    COVID-19 Virus (Coronavirus) Antibody & Titer Reflex      Other Visit Diagnoses     Visit for screening mammogram        Relevant Orders    Mammo Screening Bilateral        Return in about 4 months (around 11/17/2020) for Annual physical.    Subjective:   49 y.o. female presents for fatigue, muscle aches and headache.  Had a mildly on and off for the past 2 weeks, became more evident 2 days ago.  Of note she did have a tick bite back on July 4.  Unknown timeframe for how long the tick was attached but it was engorged upon removal.  Also of note she did have contact with somebody for COVID little over 14 days ago.  She has not noticed any symptoms such as a cough or fever.      Headache    Pertinent negatives include no abdominal pain, coughing, fever or weakness.       Review of Systems   Constitutional: Positive for fatigue. Negative for appetite change, chills and fever.   Respiratory: Negative for cough, shortness of breath and wheezing.    Cardiovascular: Negative for palpitations and leg swelling.   Gastrointestinal: Negative for abdominal pain.   Musculoskeletal: Positive for myalgias.   Neurological: Positive for headaches. Negative for weakness and light-headedness.  "  Psychiatric/Behavioral: Negative for self-injury, sleep disturbance and suicidal ideas.        History     Reviewed By Date/Time Sections Reviewed    Clemente Barrios,  7/17/2020 11:11 AM Medical, Surgical, Tobacco, Family, Socioeconomic    IlanaAlexis Jr., [facility] 7/17/2020 10:58 AM Tobacco           Objective:     Vitals:    07/17/20 1056   BP: 110/64   Pulse: 94   Resp: 12   Temp: 97.2  F (36.2  C)   TempSrc: Oral   Weight: 116 lb 9.6 oz (52.9 kg)   Height: 5' 3\" (1.6 m)     Physical Exam  Vitals signs and nursing note reviewed.   Constitutional:       General: She is not in acute distress.     Appearance: Normal appearance.   HENT:      Head: Normocephalic and atraumatic.   Eyes:      Extraocular Movements: Extraocular movements intact.      Conjunctiva/sclera: Conjunctivae normal.   Cardiovascular:      Rate and Rhythm: Normal rate and regular rhythm.      Pulses: Normal pulses.      Heart sounds: Normal heart sounds.   Pulmonary:      Effort: Pulmonary effort is normal.      Breath sounds: Normal breath sounds.   Musculoskeletal:      Right lower leg: No edema.      Left lower leg: No edema.   Skin:     Capillary Refill: Capillary refill takes less than 2 seconds.      Findings: No rash.   Neurological:      Mental Status: She is alert and oriented to person, place, and time.   Psychiatric:         Mood and Affect: Mood normal.         Thought Content: Thought content normal.         This note has been dictated using voice recognition software. Any grammatical or context distortions are unintentional and inherent to the software      "

## 2021-06-09 NOTE — TELEPHONE ENCOUNTER
Refill Approved    Rx renewed per Medication Renewal Policy. Medication was last renewed on 1/14/20, last OV 3/4/20.    Galina Avila, [facility] Triage/Med Refill 7/12/2020     Requested Prescriptions   Pending Prescriptions Disp Refills     levothyroxine (SYNTHROID, LEVOTHROID) 100 MCG tablet [Pharmacy Med Name: LEVOTHYROXINE 100 MCG TABLET] 90 tablet 1     Sig: TAKE 1 TABLET BY MOUTH EVERY DAY       Thyroid Hormones Protocol Passed - 7/10/2020 12:27 AM        Passed - Provider visit in past 12 months or next 3 months     Last office visit with prescriber/PCP: 3/4/2020 Clemente Barrios DO OR same dept: 3/4/2020 Clemente Barrios DO OR same specialty: 3/4/2020 Clemente Barrios DO  Last physical: 7/31/2019 Last MTM visit: Visit date not found   Next visit within 3 mo: Visit date not found  Next physical within 3 mo: Visit date not found  Prescriber OR PCP: Clemente Barrios DO  Last diagnosis associated with med order: 1. Hypothyroidism  - levothyroxine (SYNTHROID, LEVOTHROID) 100 MCG tablet [Pharmacy Med Name: LEVOTHYROXINE 100 MCG TABLET]; TAKE 1 TABLET BY MOUTH EVERY DAY  Dispense: 90 tablet; Refill: 1    If protocol passes may refill for 12 months if within 3 months of last provider visit (or a total of 15 months).             Passed - TSH on file in past 12 months for patient age 12 & older     TSH   Date Value Ref Range Status   01/27/2020 1.30 0.30 - 5.00 uIU/mL Final

## 2021-06-09 NOTE — TELEPHONE ENCOUNTER
Pt is calling in about a tick bite from today. Pt reports tick was on her back embedded for less than 2 hours, her daughter got the tick out with a tweezers, and did get the head. Pt is not sure if it was a deer tick as she is not originally from here. Daughter reports the tick was not flat, but was a bit swollen. Pt denies any fever, bulls eye rash, headache, or reddened area, but it does sting at the site of the bite.   Care advice given, use of antibiotic ointment, and per protocol pt should be able to monitor this at home. Pt was advised to call back if fever or rash occur in the next 2 weeks, or if the bite begins to look infected. Pt verbalized understanding.      Isak Mackay RN [facility] Triage/Medication Refill    Reason for Disposition    Tick bite with no complications    Protocols used: TICK BITE-A-

## 2021-06-11 NOTE — PROGRESS NOTES
Assessment:     1. Hypothyroidism, unspecified type  Thyroid Cascade    HM2(CBC w/o Differential)   2. Fibromyalgia  pregabalin (LYRICA) 75 MG capsule    HM2(CBC w/o Differential)   3. ETD (eustachian tube dysfunction), bilateral  budesonide (RHINOCORT AQUA) 32 mcg/actuation nasal spray       Return in about 6 months (around 12/2/2017) for Annual physical.    Plan:     Seasonal allergies  Restart nasal spray that has controlled her symptoms very well in the past.  Follow-up in 10-14 days if not improving, sooner if warning signs and symptoms as discussed.    Fibromyalgia  Continue Lyrica at 75 mg, but use the 75 mg capsule and not trying to separate a 150 mg capsule.  Relaxation and dietary discussion.  Follow-up in 6 months, sooner if warning signs or symptoms as discussed.    Hypothyroidism  Given her current symptoms of fatigue and weight changes, will recheck thyroid level.  The way changes are most likely due to her continual change in dosing of her Lyrica and Wellbutrin.  Discussed the effects of starting and stopping as well as the beneficial effects of staying on continually.    The following are part of a depression follow up plan for the patient:  coping support assessment and coping support management    Subjective:       46 y.o. female presents for evaluation feeling of swelling at the nose, fatigue feeling over the past 2 months, and fibromyalgia.  She recently went to her gynecologist for her annual Pap smear, and she had stopped taking her Wellbutrin and Lyrica at that time.  She was also noticing increased muscle aches as well as mood disturbances.  In agreement with her gynecologist she restarted Lyrica but did so at 150 mg.  She knew that was an increased dose so she is actually been dividing the capsule in half every day and taking 75 mg.  But she is not sure if she is actually getting a full 75 or less than 75 and she is not taking and the capsule formed just the granules from in the capsule.   "She has noticed with that that some nights her sleep is good other nights it is not.  She is also noticing that her appetite keeps fluctuating as well.  She also did restart her Wellbutrin.  But she did not start her Rhinocort.  For the past 2 weeks she has been noticing a little bit more of a runny nose and feels like she is having swelling across the bridge of the nose.  No pain.  No redness or blistering.  No acne.    The following portions of the patient's history were reviewed and updated as appropriate: allergies, current medications, past family history, past medical history, past social history, past surgical history and problem list.    Review of Systems  A 12 point comprehensive review of systems was negative except as noted.     History   Smoking Status     Never Smoker   Smokeless Tobacco     Never Used         Objective:      /60 (Patient Site: Left Arm, Patient Position: Sitting, Cuff Size: Adult Large)  Pulse 84  Temp 97.5  F (36.4  C) (Oral)   Resp 12  Ht 5' 3\" (1.6 m)  Wt 117 lb 12.8 oz (53.4 kg)  LMP 05/30/2017 (Exact Date)  Breastfeeding? No  BMI 20.87 kg/m2  General appearance: alert, appears stated age and cooperative  Head: Normocephalic, without obvious abnormality, atraumatic, sinuses nontender to percussion  Eyes: conjunctivae/corneas clear. PERRL, EOM's intact. Fundi benign.  Ears: normal TM's and external ear canals both ears  Nose: turbinates pale, swollen  Throat: lips, mucosa, and tongue normal; teeth and gums normal  Neck: no adenopathy, supple, symmetrical, trachea midline and thyroid not enlarged, symmetric, no tenderness/mass/nodules  Lungs: clear to auscultation bilaterally  Heart: regular rate and rhythm, S1, S2 normal, no murmur, click, rub or gallop  Extremities: extremities normal, atraumatic, no cyanosis or edema  Pulses: 2+ and symmetric       This note has been dictated using voice recognition software. Any grammatical or context distortions are unintentional " and inherent to the software

## 2021-06-12 NOTE — TELEPHONE ENCOUNTER
RN cannot approve Refill Request    RN can NOT refill this medication Protocol failed and NO refill given. Last office visit: 7/17/2020 Clemente Barrios DO Last Physical: 7/31/2019 Last MTM visit: Visit date not found Last visit same specialty: 7/17/2020 Clemente Barrios DO.  Next visit within 3 mo: Visit date not found  Next physical within 3 mo: Visit date not found      Aniyah Alcala, [facility] Triage/Med Refill 10/6/2020    Requested Prescriptions   Pending Prescriptions Disp Refills     spironolactone (ALDACTONE) 50 MG tablet [Pharmacy Med Name: SPIRONOLACTONE 50 MG TABLET] 90 tablet 1     Sig: TAKE 1 TABLET BY MOUTH EVERY DAY       Diuretics/Combination Diuretics Refill Protocol  Failed - 10/4/2020  9:41 AM        Failed - Serum Potassium in past 12 months      No results found for: LN-POTASSIUM          Failed - Serum Sodium in past 12 months      No results found for: LN-SODIUM          Failed - Serum Creatinine in past 12 months      Creatinine   Date Value Ref Range Status   07/31/2019 0.98 0.60 - 1.10 mg/dL Final             Passed - Visit with PCP or prescribing provider visit in past 12 months     Last office visit with prescriber/PCP: 7/17/2020 Clemente Barrios DO OR same dept: 3/4/2020 Cleemnte Barrios DO OR same specialty: 7/17/2020 Clemente Barrios DO  Last physical: 7/31/2019 Last MTM visit: Visit date not found   Next visit within 3 mo: Visit date not found  Next physical within 3 mo: Visit date not found  Prescriber OR PCP: Clemente Barrios DO  Last diagnosis associated with med order: 1. Rosacea  - spironolactone (ALDACTONE) 50 MG tablet [Pharmacy Med Name: SPIRONOLACTONE 50 MG TABLET]; TAKE 1 TABLET BY MOUTH EVERY DAY  Dispense: 90 tablet; Refill: 1    If protocol passes may refill for 12 months if within 3 months of last provider visit (or a total of 15 months).             Passed - Blood pressure on file in past 12 months     BP  Readings from Last 1 Encounters:   07/17/20 110/64

## 2021-06-14 NOTE — TELEPHONE ENCOUNTER
RN cannot approve Refill Request    RN can NOT refill this medication medication not on med list and Medication was discontinued. Last office visit: 7/17/2020 Clemente Barrios DO Last Physical: 7/31/2019 Last MTM visit: Visit date not found Last visit same specialty: 7/17/2020 Clemente Barrios DO.  Next visit within 3 mo: Visit date not found  Next physical within 3 mo: Visit date not found      Shira Martinez, [facility] Triage/Med Refill 1/4/2021    Requested Prescriptions   Pending Prescriptions Disp Refills     sertraline (ZOLOFT) 50 MG tablet [Pharmacy Med Name: SERTRALINE HCL 50 MG TABLET] 90 tablet 3     Sig: TAKE 0.5 TABLETS (25 MG TOTAL) BY MOUTH DAILY FOR 6 DAYS, THEN 1 TABLET (50 MG TOTAL) DAILY.       [facility] Refill Protocol  Passed - 1/4/2021 12:35 AM        Passed - PCP or prescribing provider visit in last year     Last office visit with prescriber/PCP: 7/17/2020 Clemente Barrios DO OR same dept: 3/4/2020 Clemente Barrios DO OR same specialty: 7/17/2020 Clemente Barrios DO  Last physical: 7/31/2019 Last MTM visit: Visit date not found   Next visit within 3 mo: Visit date not found  Next physical within 3 mo: Visit date not found  Prescriber OR PCP: Clemente Barrios DO  Last diagnosis associated with med order: 1. Fibromyalgia  - sertraline (ZOLOFT) 50 MG tablet [Pharmacy Med Name: SERTRALINE HCL 50 MG TABLET]; Take 0.5 tablets (25 mg total) by mouth daily for 6 days, THEN 1 tablet (50 mg total) daily.  Dispense: 90 tablet; Refill: 3    2. Fatigue  - sertraline (ZOLOFT) 50 MG tablet [Pharmacy Med Name: SERTRALINE HCL 50 MG TABLET]; Take 0.5 tablets (25 mg total) by mouth daily for 6 days, THEN 1 tablet (50 mg total) daily.  Dispense: 90 tablet; Refill: 3    If protocol passes may refill for 12 months if within 3 months of last provider visit (or a total of 15 months).

## 2021-06-14 NOTE — PROGRESS NOTES
Assessment:     1. Chest pain  Electrocardiogram Perform and Read    Echo Stress Exercise    ranitidine (ZANTAC) 150 MG tablet   2. Visit for screening mammogram  Mammo Screening Bilateral   3. Hypothyroidism, unspecified type  Thyroid Cascade   4. Vitamin D deficiency  Vitamin D, Total (25-Hydroxy)   5. Hyperlipidemia, unspecified hyperlipidemia type  Ambulatory referral to [facility]   6. Fibromyalgia     7. Gastroesophageal reflux disease without esophagitis         Return in about 6 months (around 6/8/2018) for Annual physical.    Plan:     Chest pain  Seems most consistent with reflux.  However given her family history, as well as her risk, will get stress echo for further risk stratification.  Side effects and precautions of the rimantadine were discussed.  EKG was performed in clinic initially read by me and is normal sinus rhythm with no evidence of abnormalities    Hypothyroidism  Currently on levothyroxine 100 mcg daily.  Will recheck TSH and adjust dosing as needed.  If dose adjustment and recheck in 2 months, if on stable dose and recheck in 6 months at annual physical.    Esophageal Reflux  Start H2 blocker twice a day.  If not improving in the next 2 weeks, then will adjust anxiety medications by increasing Wellbutrin, and consider EGD for further evaluation.    Vitamin D deficiency  Recheck level today, and adjust supplementation as needed.      Subjective:       47 y.o. female presents for evaluation burning sensation in the center of her chest.  It tends to come and go.  There is no correlation with exercise.  She is actually finding it comes up, when it comes up, is usually 10-20 minutes after eating.  She admits her anxiety has been slightly worse.  She is also been feeling hungry quite a bit of the time.  No nausea or vomiting.  No change to bowel or urinary habits.  Her constipation is actually cleared and she is on very good routine of daily.  She did recently travel to [place] to  "visit family.  She actually dislike coming back if she does not enjoy [place] or the [place].  She is hoping that her and her family we will move back to [place] in a few years.    The following portions of the patient's history were reviewed and updated as appropriate: allergies, current medications, past family history, past medical history, past social history, past surgical history and problem list.    Review of Systems  A 12 point comprehensive review of systems was negative except as noted.     History   Smoking Status     Never Smoker   Smokeless Tobacco     Never Used       Objective:      /60 (Patient Site: Left Arm, Patient Position: Sitting, Cuff Size: Adult Large)  Pulse 64  Temp 98.4  F (36.9  C) (Oral)   Resp 12  Ht 5' 3\" (1.6 m)  Wt 124 lb 4.8 oz (56.4 kg)  LMP 06/08/2017 (Within Weeks)  Breastfeeding? No  BMI 22.02 kg/m2  General appearance: alert, appears stated age and cooperative  Head: Normocephalic, without obvious abnormality, atraumatic  Lungs: clear to auscultation bilaterally  Heart: regular rate and rhythm, S1, S2 normal, no murmur, click, rub or gallop  Abdomen: soft, non-tender; bowel sounds normal; no masses,  no organomegaly  Extremities: extremities normal, atraumatic, no cyanosis or edema  Pulses: 2+ and symmetric  Neurologic: Alert and oriented X 3, normal strength and tone. Normal symmetric reflexes. Normal coordination and gait       This note has been dictated using voice recognition software. Any grammatical or context distortions are unintentional and inherent to the software  "

## 2021-06-16 PROBLEM — M79.10 MYALGIA: Status: ACTIVE | Noted: 2020-07-17

## 2021-06-16 PROBLEM — E55.9 VITAMIN D DEFICIENCY: Status: ACTIVE | Noted: 2017-12-08

## 2021-06-16 NOTE — PROGRESS NOTES
ASSESSMENT:  1. Vision changes  Acute onset of 30 minutes of vision changes, suspect ocular migraine.        PLAN:  1.  Overall reassurance and watchful waiting.  2.  I do recommend an eye exam in the near future and follow-up as needed.      No orders of the defined types were placed in this encounter.    There are no discontinued medications.    No follow-ups on file.    CHIEF COMPLAINT:  Chief Complaint   Patient presents with     Concerns     mulitcolor rings/circles, flashes of light, bilateral.       SUBJECTIVE:  Lolis is a 50 y.o. female who comes in because just earlier today while she was at home watching some TV and also being on her iPad, she had a sudden sense of bilateral vision changes she saw some different shapes triangles, other colors and shapes both eyes affected this lasted for about 30 minutes and then spontaneously resolved.  The patient has no change in her vision at this time, no recent vision issues she does wear readers.    She does have a history of migraine headaches, but today she had no headache whatsoever, typically when she does have a migraine headache she will take over-the-counter Aleve which will alleviate the headache.  There is been otherwise no other change in her health, she is feeling well no recent illnesses or sicknesses.    I discussed with the patient that this is most likely a manifestation of her migraine headaches called ocular migraine, I explained that this is not a sign of a more serious disorder, we do not know why this comes on, I do think it is advisable for her to see an eye doctor at her convenience just for routine eye care however I would not change anything to her medications.  Nor would I do any further testing at this time.    REVIEW OF SYSTEMS:      All other systems are negative.    PFSH:  Immunization History   Administered Date(s) Administered     Hep A, Adult IM (19yr & older) 02/26/2015, 09/09/2015     Hep B, Adult 02/26/2015, 12/21/2015      INFLUENZA,SEASONAL QUAD, PF, =/> 6months 03/01/2018     Influenza, inj, historic,unspecified 12/19/2019     Influenza, nasal, historic 12/17/2012, 10/23/2014     Influenza,live, Nasal Laiv4 11/19/2013, 12/21/2015     Influenza,seasonal,quad inj =/> 6months 01/06/2017, 11/12/2018     Tdap 04/19/2013     Social History     Socioeconomic History     Marital status:      Spouse name: Hugo     Number of children: 1     Years of education: 16     Highest education level: Bachelor's degree (e.g., BA, AB, BS)   Occupational History     Occupation: Customer Service     Employer: [employer]   Social Needs     Financial resource strain: Not on file     Food insecurity     Worry: Not on file     Inability: Not on file     Transportation needs     Medical: Not on file     Non-medical: Not on file   Tobacco Use     Smoking status: Never Smoker     Smokeless tobacco: Never Used   Substance and Sexual Activity     Alcohol use: Not Currently     Drug use: Not Currently     Sexual activity: Yes     Partners: Male     Birth control/protection: Pill   Lifestyle     Physical activity     Days per week: Not on file     Minutes per session: Not on file     Stress: Not on file   Relationships     Social connections     Talks on phone: Not on file     Gets together: Not on file     Attends Yarsanism service: Not on file     Active member of club or organization: Not on file     Attends meetings of clubs or organizations: Not on file     Relationship status: Not on file     Intimate partner violence     Fear of current or ex partner: Not on file     Emotionally abused: Not on file     Physically abused: Not on file     Forced sexual activity: Not on file   Other Topics Concern     Not on file   Social History Narrative    She currently lives with family and does not work.     Past Medical History:   Diagnosis Date     Abnormal weight loss      Allergic rhinitis      Esophageal reflux      Family history of colon cancer      Fibrocystic  breast      Fibromyalgia      Hyperlipemia      Hypothyroidism      Neoplasm      Ovarian cyst      Family History   Problem Relation Age of Onset     Cancer Maternal Grandmother         Uterine     Colon polyps Mother      Stroke Mother      Dementia Mother      Alzheimer's disease Mother      Hypertension Mother      Other Father         Blood Disorder     Stroke Father      Heart disease Father      Thyroid disease Brother      No Medical Problems Sister      No Medical Problems Brother      BRCA 1/2 Neg Hx      Breast cancer Neg Hx      Colon cancer Neg Hx      Endometrial cancer Neg Hx      Ovarian cancer Neg Hx        MEDICATIONS:  Current Outpatient Medications   Medication Sig Dispense Refill     buPROPion (WELLBUTRIN XL) 150 MG 24 hr tablet Take 1 tablet (150 mg total) by mouth daily. 90 tablet 3     JUNEL 1/20, 21, 1-20 mg-mcg per tablet Take 1 tablet by mouth daily.        levothyroxine (SYNTHROID, LEVOTHROID) 100 MCG tablet Take 1 tablet (100 mcg total) by mouth daily. 90 tablet 2     simvastatin (ZOCOR) 40 MG tablet TAKE 1 TABLET (40 MG TOTAL) BY MOUTH AT BEDTIME. 90 tablet 3     spironolactone (ALDACTONE) 50 MG tablet TAKE 1 TABLET BY MOUTH EVERY DAY 90 tablet 1     No current facility-administered medications for this visit.        TOBACCO USE:  Social History     Tobacco Use   Smoking Status Never Smoker   Smokeless Tobacco Never Used       VITALS:  Vitals:    03/29/21 1519   BP: 98/60   Pulse: 92   Weight: 116 lb 8 oz (52.8 kg)     Wt Readings from Last 3 Encounters:   03/29/21 116 lb 8 oz (52.8 kg)   07/17/20 116 lb 9.6 oz (52.9 kg)   03/04/20 117 lb 3.2 oz (53.2 kg)       PHYSICAL EXAM:  Constitutional:   Reveals a male who appears overall healthy.  Vitals: per nursing notes.  Eyes:  EOMs full, PERRL.  Musculoskeletal: No peripheral swelling.  Neuro:  Alert and oriented. Cranial nerves, motor, sensory exams are intact.  No gross focal deficits.  Psychiatric:  Memory intact, mood  appropriate.    QUALITY MEASURES:      DATA REVIEWED:

## 2021-06-16 NOTE — PROGRESS NOTES
Assessment:     1. Right lower quadrant abdominal pain     2. Need for vaccination  Influenza, Seasonal Quad, Preservative Free 36+ Months   3. Fibromyalgia         Return if symptoms worsen or fail to improve.    Plan:     Abdominal pain  This has resolved.  Diet and exercise discussed.  Prevention of constipation discussed.     Fibromyalgia  Patient did restart her Wellbutrin and is feeling better since doing so.  She restarted 10 days ago.  She states her mood has improved considerably.  Will plan to continue at this time.      Subjective:       47 y.o. female presents for evaluation follow-up from emergency room visit.  Patient presented to the emergency room for right lower quadrant abdominal pain that was not improving and worsening.  Emergency room notes and radiological and laboratory data were reviewed.  Patient had normal white count.  She had a CT of the abdomen after ultrasound was not able to visualize the appendix.  The ultrasound also was not able to visualize the ovaries.  There was small amount of free fluid in the lower pelvic area and quite a bit of air in the intestines.  After treated with pain medications and going home patient's symptoms did resolve.  However she had significant anxiety and the morphine and a feeling that she just wanted the morphine out of her system as it gave a very odd feeling in mild nausea.  After 12 hours it did clear.  She did not require any the oxycodone.  Asked by time she got home the abdominal pain was gone it was just the anxiety and odd feeling from the morphine that she fell.  Those did clear within 12 hours.  Since that timeframe she has had no recurrence of the abdominal pain.    Patient states that 10 days ago also she did restart her Wellbutrin.  She had come off it for a while however her depression started worsening.  She had no homicidal or suicidal ideation or thoughts of harming self or others, but she basically was in what she calls a very bad pattern  "of sleeping, then getting up to eat, and then not completing anything which made her very sad and crying which made her want to sleep more when intern eat more, and with eating and sleeping more she again felt sad and started crying.  She restarted Wellbutrin 10 days ago.  Also started getting out and walking with the weather warming up.  With this she did notice significant improvements.  She now feels like she is back to her \"normal\" self and is getting tasks and chores done throughout the day, and she starting to enjoy the day again.    The following portions of the patient's history were reviewed and updated as appropriate: allergies, current medications, past family history, past medical history, past social history, past surgical history and problem list.    Review of Systems  A 12 point comprehensive review of systems was negative except as noted.     History   Smoking Status     Never Smoker   Smokeless Tobacco     Never Used       Objective:      /64 (Patient Site: Left Arm, Patient Position: Sitting, Cuff Size: Adult Large)  Pulse 76  Temp 98.3  F (36.8  C) (Oral)   Resp 12  Ht 5' 3\" (1.6 m)  Wt 123 lb 11.2 oz (56.1 kg)  LMP 01/24/2018 (Within Days)  Breastfeeding? No  BMI 21.91 kg/m2  General appearance: alert, appears stated age and cooperative  Head: Normocephalic, without obvious abnormality, atraumatic  Lungs: clear to auscultation bilaterally  Heart: regular rate and rhythm, S1, S2 normal, no murmur, click, rub or gallop  Abdomen: soft, non-tender; bowel sounds normal; no masses,  no organomegaly  Neurologic: Mental status: Alert, oriented, thought content appropriate       This note has been dictated using voice recognition software. Any grammatical or context distortions are unintentional and inherent to the software  "

## 2021-06-16 NOTE — TELEPHONE ENCOUNTER
RN cannot approve Refill Request    RN can NOT refill this medication PCP messaged that patient is overdue for Labs. Last office visit: 7/17/2020 Clemente Barrios DO Last Physical: 7/31/2019 Last MTM visit: Visit date not found Last visit same specialty: 3/29/2021 Philipp Lowery MD.  Next visit within 3 mo: Visit date not found  Next physical within 3 mo: Visit date not found      Galina Avila, [facility] Triage/Med Refill 4/3/2021    Requested Prescriptions   Pending Prescriptions Disp Refills     spironolactone (ALDACTONE) 50 MG tablet [Pharmacy Med Name: SPIRONOLACTONE 50 MG TABLET] 90 tablet 1     Sig: TAKE 1 TABLET BY MOUTH EVERY DAY       Diuretics/Combination Diuretics Refill Protocol  Failed - 4/3/2021  9:30 AM        Failed - Serum Potassium in past 12 months      No results found for: LN-POTASSIUM          Failed - Serum Sodium in past 12 months      No results found for: LN-SODIUM          Failed - Serum Creatinine in past 12 months      Creatinine   Date Value Ref Range Status   07/31/2019 0.98 0.60 - 1.10 mg/dL Final             Passed - Visit with PCP or prescribing provider visit in past 12 months     Last office visit with prescriber/PCP: 7/17/2020 Clemente Barrios DO OR same dept: Visit date not found OR same specialty: 3/29/2021 Philipp Lowery MD  Last physical: 7/31/2019 Last MTM visit: Visit date not found   Next visit within 3 mo: Visit date not found  Next physical within 3 mo: Visit date not found  Prescriber OR PCP: Clemente Barrios DO  Last diagnosis associated with med order: 1. Rosacea  - spironolactone (ALDACTONE) 50 MG tablet [Pharmacy Med Name: SPIRONOLACTONE 50 MG TABLET]; TAKE 1 TABLET BY MOUTH EVERY DAY  Dispense: 90 tablet; Refill: 1    2. Hypothyroidism  - levothyroxine (SYNTHROID, LEVOTHROID) 100 MCG tablet [Pharmacy Med Name: LEVOTHYROXINE 100 MCG TABLET]; TAKE 1 TABLET BY MOUTH EVERY DAY  Dispense: 90 tablet; Refill: 2    3. Fatigue  -  buPROPion (WELLBUTRIN XL) 150 MG 24 hr tablet [Pharmacy Med Name: BUPROPION HCL  MG TABLET]; TAKE 2 TABLETS (300 MG TOTAL) BY MOUTH DAILY.  Dispense: 180 tablet; Refill: 3    4. Fibromyalgia  - buPROPion (WELLBUTRIN XL) 150 MG 24 hr tablet [Pharmacy Med Name: BUPROPION HCL  MG TABLET]; TAKE 2 TABLETS (300 MG TOTAL) BY MOUTH DAILY.  Dispense: 180 tablet; Refill: 3    If protocol passes may refill for 12 months if within 3 months of last provider visit (or a total of 15 months).             Passed - Blood pressure on file in past 12 months     BP Readings from Last 1 Encounters:   03/29/21 98/60                levothyroxine (SYNTHROID, LEVOTHROID) 100 MCG tablet [Pharmacy Med Name: LEVOTHYROXINE 100 MCG TABLET] 90 tablet 2     Sig: TAKE 1 TABLET BY MOUTH EVERY DAY       Thyroid Hormones Protocol Passed - 4/3/2021  9:30 AM        Passed - Provider visit in past 12 months or next 3 months     Last office visit with prescriber/PCP: 7/17/2020 Clemente Barrios DO OR same dept: Visit date not found OR same specialty: 3/29/2021 Philipp Lowery MD  Last physical: 7/31/2019 Last MTM visit: Visit date not found   Next visit within 3 mo: Visit date not found  Next physical within 3 mo: Visit date not found  Prescriber OR PCP: Clemente Barrios DO  Last diagnosis associated with med order: 1. Rosacea  - spironolactone (ALDACTONE) 50 MG tablet [Pharmacy Med Name: SPIRONOLACTONE 50 MG TABLET]; TAKE 1 TABLET BY MOUTH EVERY DAY  Dispense: 90 tablet; Refill: 1    2. Hypothyroidism  - levothyroxine (SYNTHROID, LEVOTHROID) 100 MCG tablet [Pharmacy Med Name: LEVOTHYROXINE 100 MCG TABLET]; TAKE 1 TABLET BY MOUTH EVERY DAY  Dispense: 90 tablet; Refill: 2    3. Fatigue  - buPROPion (WELLBUTRIN XL) 150 MG 24 hr tablet [Pharmacy Med Name: BUPROPION HCL  MG TABLET]; TAKE 2 TABLETS (300 MG TOTAL) BY MOUTH DAILY.  Dispense: 180 tablet; Refill: 3    4. Fibromyalgia  - buPROPion (WELLBUTRIN XL) 150 MG 24 hr  tablet [Pharmacy Med Name: BUPROPION HCL  MG TABLET]; TAKE 2 TABLETS (300 MG TOTAL) BY MOUTH DAILY.  Dispense: 180 tablet; Refill: 3    If protocol passes may refill for 12 months if within 3 months of last provider visit (or a total of 15 months).             Passed - TSH on file in past 12 months for patient age 12 & older     TSH   Date Value Ref Range Status   07/17/2020 0.52 0.30 - 5.00 uIU/mL Final                      buPROPion (WELLBUTRIN XL) 150 MG 24 hr tablet [Pharmacy Med Name: BUPROPION HCL  MG TABLET] 180 tablet 3     Sig: TAKE 2 TABLETS (300 MG TOTAL) BY MOUTH DAILY.       Tricyclics/Misc Antidepressant/Antianxiety Meds Refill Protocol Passed - 4/3/2021  9:30 AM        Passed - PCP or prescribing provider visit in last year     Last office visit with prescriber/PCP: 7/17/2020 Clemente Barrios DO OR same dept: Visit date not found OR same specialty: 3/29/2021 Philipp Lowery MD  Last physical: 7/31/2019 Last MTM visit: Visit date not found   Next visit within 3 mo: Visit date not found  Next physical within 3 mo: Visit date not found  Prescriber OR PCP: Clemente Barrios DO  Last diagnosis associated with med order: 1. Rosacea  - spironolactone (ALDACTONE) 50 MG tablet [Pharmacy Med Name: SPIRONOLACTONE 50 MG TABLET]; TAKE 1 TABLET BY MOUTH EVERY DAY  Dispense: 90 tablet; Refill: 1    2. Hypothyroidism  - levothyroxine (SYNTHROID, LEVOTHROID) 100 MCG tablet [Pharmacy Med Name: LEVOTHYROXINE 100 MCG TABLET]; TAKE 1 TABLET BY MOUTH EVERY DAY  Dispense: 90 tablet; Refill: 2    3. Fatigue  - buPROPion (WELLBUTRIN XL) 150 MG 24 hr tablet [Pharmacy Med Name: BUPROPION HCL  MG TABLET]; TAKE 2 TABLETS (300 MG TOTAL) BY MOUTH DAILY.  Dispense: 180 tablet; Refill: 3    4. Fibromyalgia  - buPROPion (WELLBUTRIN XL) 150 MG 24 hr tablet [Pharmacy Med Name: BUPROPION HCL  MG TABLET]; TAKE 2 TABLETS (300 MG TOTAL) BY MOUTH DAILY.  Dispense: 180 tablet; Refill: 3    If  protocol passes may refill for 12 months if within 3 months of last provider visit (or a total of 15 months).

## 2021-06-17 NOTE — PATIENT INSTRUCTIONS - HE
Patient Instructions by Maria Del Rosario Zavala PT at 6/18/2019 10:00 AM     Author: Maria Del Rosario Zavala PT Service: -- Author Type: Physical Therapist    Filed: 6/18/2019 10:17 AM Encounter Date: 6/18/2019 Status: Addendum    : Maria Del Rosario Zavala PT (Physical Therapist)    Related Notes: Original Note by Maria Del Rosario Zavala PT (Physical Therapist) filed at 6/18/2019 10:07 AM       ADD BAND TO [place] AND PERFORM 3 DAYS/WEEK      Change double leg bridge to single leg bridge. Hold for 5-10 seconds. Perform until fatigue, 3 days/week        Please call patient let them know that I sent an refill for his Lantus and his Victoza to his pharmacy.

## 2021-06-17 NOTE — PATIENT INSTRUCTIONS - HE
Patient Instructions by Maria Del Rosario Zavala PT at 6/11/2019 10:30 AM     Author: Maria Del Rosario Zavala PT Service: -- Author Type: Physical Therapist    Filed: 6/11/2019 10:58 AM Encounter Date: 6/11/2019 Status: Addendum    : Maria Del Rosario Zavala PT (Physical Therapist)    Related Notes: Original Note by Maria Del Rosario Zavala PT (Physical Therapist) filed at 6/11/2019 10:56 AM            MONSTER WALK    side step both ways with knees slightly bent, keeping tension in the resistance band. Do until fatigue. Perform 3 days/week.         Single leg squat off of stair: do until fatigue, no holds. Perform 3 days/week on each side.

## 2021-06-17 NOTE — PATIENT INSTRUCTIONS - HE
"Patient Instructions by Maria Del Rosario Zavala PT at 5/28/2019 10:00 AM     Author: Maria Del Rosario Zavala PT Service: -- Author Type: Physical Therapist    Filed: 5/28/2019 10:44 AM Encounter Date: 5/28/2019 Status: Signed    : Maria Del Rosario Zavala PT (Physical Therapist)       It is recommended that you ice 2-3x/day for 20 minutes at a time. Remember to not apply ice directly on skin.     STRAIGHT LEG RAISE - SLR    While lying or sitting, raise up your leg with a straight knee.  Keep the opposite knee bent with the foot planted to the ground. Hold for 5 seconds. Do until fatigue on each side. Perform 1-2x/day      BRIDGING    While lying on your back, tighten your lower abdominals, squeeze your buttocks and then raise your buttocks off the floor/bed as creating a \"Bridge\" with your body. Hold for 5-10 seconds at a time. Perform until fatigue. Perform 1-2x/day      CLAM SHELLS    While lying on your side with your knees bent, draw up the top knee while keeping contact of your feet together.    Do not let your pelvis roll back during the lifting movement.  Hold for 5 seconds. Do until fatigue both sides. Perform 1-2x/day            "

## 2021-06-17 NOTE — PATIENT INSTRUCTIONS - HE
Patient Instructions by Angelia Pierson PTA at 6/4/2019  1:00 PM     Author: Angelia Pierson PTA Service: -- Author Type: Physical Therapist Assistant    Filed: 6/4/2019  1:31 PM Encounter Date: 6/4/2019 Status: Signed    : Angelia Pierson PTA (Physical Therapist Assistant)        PLANK    While lying face down, lift your body up on your elbows and toes. Try and maintain a straight spine. Do not allow your hips or pelvis on either side to drop.     You may modify by planking on your knees      LATERAL PLANK    While lying on your side with your knees bent, lift your body up on your elbow. Try and maintain a straight spine.    You may modify be bending your knees.    PLANK LATERAL WITH HIP ABDUCTION    While lying on your side, lift your body up on your elbow and feet. Next, slowly raise up the top most leg upwards, then return. Try and maintain a straight spine the entire time.

## 2021-06-17 NOTE — PATIENT INSTRUCTIONS - HE
Patient Instructions by Clemente Barrios DO at 7/31/2019  1:40 PM     Author: Clemente Barrios DO Service: -- Author Type: Physician    Filed: 7/31/2019  2:06 PM Encounter Date: 7/31/2019 Status: Signed    : Clemente Barrois DO (Physician)         Patient Education   Understanding USDA MyPlate  The USDA (US Department of Agriculture) has guidelines to help you make healthy food choices. These are called MyPlate. MyPlate shows the food groups that make up healthy meals using the image of a place setting. Before you eat, think about the healthiest choices for what to put onto your plate or into your cup or bowl. To learn more about building a healthy plate, visit www.chooseMONOCOplate.gov.       The Food Groups    Fruits: Any fruit or 100% fruit juice counts as part of the Fruit Group. Fruits may be fresh, canned, frozen, or dried, and may be whole, cut-up, or pureed. Make half your plate fruits and vegetables.    Vegetables: Any vegetable or 100% vegetable juice counts as a member of the Vegetable Group. Vegetables may be fresh, frozen, canned, or dried. They can be served raw or cooked and may be whole, cut-up, or mashed. Make half your plate fruits and vegetables.     Grains: All foods made from grains are part of the Grains Group. These include wheat, rice, oats, cornmeal, and barley such as bread, pasta, oatmeal, cereal, tortillas, and grits. Grains should be no more than a quarter of your plate. At least half of your grains should be whole grains.    Protein: This group includes meat, poultry, seafood, beans and peas, eggs, processed soy products (like tofu), nuts (including nut butters), and seeds. Make protein choices no more than a quarter of your plate. Meat and poultry choices should be lean or low fat.    Dairy: All fluid milk products and foods made from milk that contain calcium, like yogurt and cheese are part of the Dairy Group. (Foods that have little calcium, such as  cream, butter, and cream cheese, are not part of the group.) Most dairy choices should be low-fat or fat-free.    Oils: These are fats that are liquid at room temperature. They include canola, corn, olive, soybean, and sunflower oil. Foods that are mainly oil include mayonnaise, certain salad dressings, and soft margarines. You should have only 5 to 7 teaspoons of oils a day. You probably already get this much from the food you eat.  Use [employer] to Help Build Your Meals  The [employer] can help you plan and track your meals and activity. You can look up individual foods to see or compare their nutritional value. You can get guidelines for what and how much you should eat. You can compare your food choices. And you can assess personal physical activities and see ways you can improve. Go to www.[employer].gov/supertracker/.    6344-3570 [employer]. [address] [place] [address], [place] [address]. All rights reserved. This information is not intended as a substitute for professional medical care. Always follow your healthcare professional's instructions.

## 2021-06-18 NOTE — LETTER
Letter by Jessica Madsen FNP at      Author: Jessica Madsen FNP Service: -- Author Type: --    Filed:  Encounter Date: 1/9/2019 Status: (Other)       Lolis MERAZ Liana  [address] [facility] [address]             January 9, 2019         Dear Ms. Pittn,    Below are the results from your recent visit:    Resulted Orders   Urinalysis-UC if Indicated   Result Value Ref Range    Color, UA Yellow Colorless, Yellow, Straw, Light Yellow    Clarity, UA Clear Clear    Glucose, UA Negative Negative    Bilirubin, UA Negative Negative    Ketones, UA Negative Negative    Specific Gravity, UA 1.020 1.005 - 1.030    Blood, UA Moderate (!) Negative    pH, UA 7.0 5.0 - 8.0    Protein, UA Negative Negative mg/dL    Urobilinogen, UA 0.2 E.U./dL 0.2 E.U./dL, 1.0 E.U./dL    Nitrite, UA Negative Negative    Leukocytes, UA Negative Negative    Narrative    QNS for microscopic   Culture, Urine   Result Value Ref Range    Culture No Growth         No growth. Follow up with your PCP if symptom persist.    Please call with questions or contact us using [facility].    Sincerely,        Electronically signed by DERICK Perkins

## 2021-06-20 NOTE — LETTER
Letter by Severson, Tammie F, LPN at      Author: Severson, Tammie F, LPN Service: -- Author Type: --    Filed:  Encounter Date: 7/23/2020 Status: (Other)       7/23/2020        Lolis Pittn  [address] [address]    COVID-19 Antibody Screen   Date Value Ref Range Status   07/17/2020 Negative  Final     Comment:     No COVID-19 antibodies detected.  Patients within 10 days of symptom onset for  COVID-19 may not produce sufficient levels of detectable antibodies.  Immunocompromised COVID-19 patients may take longer to develop antibodies.     COVID-19 IgG Titer   Date Value Ref Range Status   07/17/2020 Not Applicable  Final     Comment:     Qualitative screen for total antibodies to COVID-19 (SARS-CoV-2) with  semi-quantitative measurement of IgG COVID-19 antibodies by endpoint titer.  COVID-19 antibodies may be elevated due to a past or current infection.  Negative results do not rule out COVID-19 infection.  Results from antibody  testing should not be used as the sole basis to diagnose or exclude SARS-CoV-2  infection or to inform infection status.  COVID-19 PCR test should be ordered  if current infection is suspected.  False positive results may occur in rare  cases due to cross-reacting antibodies.  This test was developed and its performance characteristics determined by the  [facility] ([facility]),  which is regulated under CLIA as qualified to perform high-complexity testing.  This test has not been reviewed by the FDA.  Testing performed by [facility], [facility], [address], [address], [place] [address]       No results found for: [id number]    You have tested NEGATIVE for COVID-19 antibodies. This suggests you have not had or been exposed to COVID-19. But it does not mean that for sure.    The test finds antibodies in most people 10 days after they get sick. For some people, it  takes longer than 10 days for antibodies to show up. Others may never show antibodies against COVID-19, especially if they have weak immune systems.    If you have COVID-19 symptoms now, please stay home and away from others.     Your current symptoms may or may not be COVID-19.     What is antibody testing?  This is a kind of blood test. We take a small sample of your blood, and then test it for something called antibodies.   Your body makes antibodies to fight infection. If your blood has antibodies for a certain germ, it means youve been infected with that germ in the past.   Sometimes, antibodies stay in your body for years after youve had the infection. They can be there even if the germ didnt make you sick. They are a sign that your body fought off the infection.  Will this test find antibodies in everyone whos had COVID-19?  No. The test finds antibodies in most people 10 days after they get sick. For some people, it takes longer than 10 days for antibodies to show up. Others may never show antibodies against COVID-19, especially if they have weak immune systems.  What are the signs of COVID-19?  Signs of COVID-19 can appear from 2 to 14 days (up to 2 weeks) after youre infected. Some people have no symptoms or only mild symptoms. Others get very sick. The most common symptoms are:      Cough    Shortness of breath or trouble breathing    Or at least 2 of these symptoms:      Fever    Chills    Repeated shaking with chills    Muscle pain    Headache    Sore throat    Losing your sense of taste or smell    You may have other symptoms. Please contact your doctor or clinic for any symptoms that worry you.    Where can I get more information?     To learn the [facility] guidelines for staying home, please visit the [facility] of Health website at [email or web address]    To learn more about COVID-19 and how to care for yourself at home, please visit the CDC  website at https://www.cdc.gov/coronavirus/2019-ncov/about/steps-when-sick.html    For more options for care at [facility], please visit our website at [email or web address]    [place] [facility] ([facility]) COVID-19 Hotline:  [phone]

## 2021-06-22 NOTE — PROGRESS NOTES
Assessment:   1. UTI symptoms  Negative urinalysis but positive for moderate blood which patient reports always shows up in her urinalysis. We plan getting urine culture and follow up. Patient also have a scheduled appointment with her OBGYN for her annual wellness.   - Urinalysis-UC if Indicated  - Culture, Urine     Plan:   1. Medications: not indicated at this time  2. Maintain adequate hydration  3. Follow up if symptoms not improving, and prn.     Subjective:   Lolis Gaines is a 48 y.o. female who complains of burning with urination and pain in the RLQ and in the LLQ for 1 days.  Patient also complains of no other symptom. Patient denies back pain, congestion, cough, fever, headache, rhinitis, sorethroat and vaginal discharge.  Patient does have a history of recurrent UTI.  Patient does not have a history of pyelonephritis.  The following portions of the patient's history were reviewed and updated as appropriate: allergies, current medications, past family history, past medical history, past social history, past surgical history and problem list.  Review of Systems  Pertinent items are noted in HPI.      Objective:      /70 (Patient Site: Right Arm, Patient Position: Sitting, Cuff Size: Adult Regular)   Pulse 90   Temp 97.5  F (36.4  C) (Oral)   Wt 118 lb 4 oz (53.6 kg)   LMP  (LMP Unknown)   SpO2 99%   BMI 20.95 kg/m    General: alert, appears stated age and cooperative   Abdomen: Tenderness mild in the periumbilical area, in the RLQ and in the LLQ of the lower abdomen   Back: back muscles are normal   : defer exam     Laboratory:   Urine dipstick shows negative for all components except for moderate blood.

## 2021-06-22 NOTE — PROGRESS NOTES
Assessment/Plan:     Problem List Items Addressed This Visit     Hyperlipidemia    Relevant Orders    ALT (SGPT)    Lipid Cascade    Hypothyroidism    Relevant Orders    Basic Metabolic Panel    Thyroid Cascade    HM2(CBC w/o Differential)    Vitamin D deficiency    Relevant Orders    Vitamin D, Total (25-Hydroxy)      Other Visit Diagnoses     Chronic pain of right knee    -  Primary    With giving out symptom, and physical exam, worried for PCL injury.  Will get MRI for further evaluation.    Relevant Orders    MR Knee Without Contrast Right    Ganglion cyst        Left foot. Compression wrap, ice. Follow up in 3 months if still present          HMP: Patient has upcoming annual physical in January.  We will preordered laboratory work now and have completed prior to appointment so they can be reviewed at that time.  Will cover preventative aspects at that time as well.    Return in about 6 weeks (around 1/21/2019) for Annual physical.      Subjective:       48 y.o. female presents for evaluation right knee pain for the past 6 months.  Mainly comes up underneath the knee.  She notices when her knee hyperextends.  She just be standing there and I will hyperextend and start causing pain.  Then will start to swell.  She is also feels a little unstable especially with twisting and turning and going down stairs.  She has not fallen.  It has not locked.  There is a very distant history of trauma to the knee and ever since that timeframe that is when she has been able to hyperextend her knee.  However the pain is becoming more frequent now that she is working part-time at a clothing store.  Also with that work she is noticed the pain that comes and goes on the side of her right upper foot.  Not always present.  Mainly comes up by the end of the day if she is at work on her feet all day.  Since she gets off her feet it goes away.  She has some mild bruising there last week but that has cleared.      History     Reviewed By  "Date/Time Sections Reviewed    Denys Barriosmaria c Viki,  12/10/2018 10:40 AM Medical, Surgical, Tobacco, Alcohol, Drug Use, Sexual Activity, Family    Alexis Preciado Jr., [facility] 12/10/2018 10:23 AM Tobacco          Review of Systems  Pertinent items are noted in HPI.        Objective:     Vitals:    12/10/18 1020   BP: 102/64   Pulse: 76   Resp: 12   Temp: 98.4  F (36.9  C)   TempSrc: Oral   Weight: 118 lb 14.4 oz (53.9 kg)   Height: 5' 3\" (1.6 m)   PainSc:   6   PainLoc: Leg       Physical Exam   Constitutional: She is oriented to person, place, and time. She appears well-developed and well-nourished.   HENT:   Head: Normocephalic and atraumatic.   Cardiovascular: Normal rate, regular rhythm, normal heart sounds and intact distal pulses.   Pulmonary/Chest: Effort normal and breath sounds normal.   Musculoskeletal:   Right knee with no tenderness palpation along the joint line, however the drawer test on the right to the posterior motion had much more movement than compared to left.  Anterior drawer is negative bilaterally.  Positive Lockman's on the right knee of the lateral joint line.  Right foot with no gross abnormalities but tender to palpation over the tarsal tunnel.   Neurological: She is alert and oriented to person, place, and time. She has normal strength. No sensory deficit.   Reflex Scores:       Patellar reflexes are 2+ on the right side and 2+ on the left side.       Achilles reflexes are 2+ on the right side and 2+ on the left side.  Nursing note and vitals reviewed.          This note has been dictated using voice recognition software. Any grammatical or context distortions are unintentional and inherent to the software  "

## 2021-06-23 NOTE — TELEPHONE ENCOUNTER
Refill Approved    Rx renewed per Medication Renewal Policy. Medication was last renewed on 10/26/18.    Meliton Castro, [facility] Triage/Med Refill 1/25/2019     Requested Prescriptions   Pending Prescriptions Disp Refills     simvastatin (ZOCOR) 40 MG tablet [Pharmacy Med Name: SIMVASTATIN 40 MG TABLET] 90 tablet 0     Sig: TAKE 1 TABLET (40 MG TOTAL) BY MOUTH AT BEDTIME.    Statins Refill Protocol (Hmg CoA Reductase Inhibitors) Passed - 1/23/2019  4:29 AM       Passed - PCP or prescribing provider visit in past 12 months     Last office visit with prescriber/PCP: 12/10/2018 Clemente Barrios DO OR same dept: 12/10/2018 Clemente Barrios DO OR same specialty: 1/7/2019 Jessica Madsen, DERICK  Last physical: 12/21/2015 Last MTM visit: Visit date not found   Next visit within 3 mo: Visit date not found  Next physical within 3 mo: Visit date not found  Prescriber OR PCP: Clemente Barrios DO  Last diagnosis associated with med order: 1. Hyperlipidemia  - simvastatin (ZOCOR) 40 MG tablet [Pharmacy Med Name: SIMVASTATIN 40 MG TABLET]; TAKE 1 TABLET (40 MG TOTAL) BY MOUTH AT BEDTIME.  Dispense: 90 tablet; Refill: 0    If protocol passes may refill for 12 months if within 3 months of last provider visit (or a total of 15 months).

## 2021-06-23 NOTE — TELEPHONE ENCOUNTER
[facility] has reached out to patient to try to assist with scheduling, but they have not been able to reach her. I have also mailed a letter to her, and have had no response.

## 2021-06-27 ENCOUNTER — HEALTH MAINTENANCE LETTER (OUTPATIENT)
Age: 51
End: 2021-06-27

## 2021-06-27 NOTE — PROGRESS NOTES
Progress Notes by Clemente Barrios DO at 7/31/2019  1:40 PM     Author: Clemente Barrios DO Service: -- Author Type: Physician    Filed: 7/31/2019  5:42 PM Encounter Date: 7/31/2019 Status: Signed    : Clemente Barrios DO (Physician)       FEMALE PREVENTATIVE EXAM    Assessment and Plan:       Problem List Items Addressed This Visit     Hyperlipidemia     Recheck levels as per guidance.  Continue current dosing of simvastatin 40 mg daily.         Fibromyalgia     Slight worsening with current life changes.  With through that this is very similar to her grieving process.  Did note that patient is still making very good choices and staying active in working on doing what her family needs this bite the emotional changes.  Safety plan discussed.  She and her  are going through counseling on their own.  She also has counseling on her own.         Hypothyroidism     Has been stable on current dosing.  Will recheck levels and adjust as needed.         Esophageal Reflux     Controlled well with dietary changes and behavioral changes.         Rosacea     Tolerating spironolactone well.  Will continue at current dosing.         Vitamin D deficiency     Recheck levels and adjust supplementation as needed.           Other Visit Diagnoses     Routine general medical examination at a health care facility    -  Primary    Screening for HIV without presence of risk factors        Relevant Orders    HIV Antigen/Antibody Screening Arthur            Next follow up:  Return in about 6 months (around 1/31/2020) for Thyroid.    Immunization Review  Adult Imm Review: No immunizations due today    I discussed the following with the patient:   Adult Healthy Living: Importance of regular exercise  Stress management  Distracted driving  Supplement use    I have had an Advance Directives discussion with the patient.    Subjective:   Chief Complaint: Lolis Gaines is an 49 y.o. female here for a  preventative health visit.     HPI:  Denies any chest pain, shortness of breath, dyspnea exertion, palpitations, nausea or vomiting.  Denies any changes in vision or hearing, or urinary or bowel habits.  Patient has had increased anxiety.  Her and her family has gone through a rough spell.  Her  lost his job.  He lost his job with some underlying anger issues that he has.  She is actually recommended him previously that he should think about counseling to help change.  However if she feels now they did not listen.  As such she is going through anger.  She did tell him that to recently and they are working through the issue.  They have gone to couples counseling as well as individual counseling and it is helping.  He is having difficulty finding a job as such she is going back to work and was recently hired on to [employer] at [employer].  She is not happy about this life change or having to go back to work.  But as she stated it something that the family needs at this time.  She is feeling very emotional.  She is going to go to [place] to her family with her daughter for about a week to help calm down while her  continues to look for a job, and prior to her starting work at [employer].    Healthy Habits  Are you taking a daily aspirin? No  Do you typically exercising at least 40 min, 3-4 times per week?  Yes  Do you usually eat at least 4 servings of fruit and vegetables a day, include whole grains and fiber and avoid regularly eating high fat foods? NO  Have you had an eye exam in the past two years? Yes  Do you see a dentist twice per year? NO  Do you have any concerns regarding sleep? YES    Safety Screen  If you own firearms, are they secured in a locked gun cabinet or with trigger locks? The patient does not own any firearms  Do you feel you are safe where you are living?: Yes (7/31/2019  1:41 PM)  Do you feel you are safe in your relationship(s)?: Yes (7/31/2019  1:41 PM)      Review of Systems:   "Please see above.  The rest of the review of systems are negative for all systems.       Cancer Screening       Status Date      PAP SMEAR Postponed 8/31/2019 Originally 12/17/2015. Waiting for Documentation/Doc Correction     Done 12/17/2014 GYNECOLOGIC CYTOLOGY (PAP SMEAR)    MAMMOGRAM Next Due 4/30/2020      Done 4/30/2019 MAMMO SCREENING BILATERAL     Patient has more history with this topic...    COLONOSCOPY Next Due 12/31/2020      Done 12/31/2015 COLONOSCOPY EXTERNAL RESULT              History     Reviewed By Date/Time Sections Reviewed    Clemente Barrios, DO 7/31/2019  2:01 PM Medical, Surgical, Tobacco, Alcohol, Drug Use, Sexual Activity, Family    IlanaAlexis oliveros Jr., [facility] 7/31/2019  1:55 PM Family    IlanaAlexis oliveros Jr., [facility] 7/31/2019  1:54 PM Family    IlanaAlexis oliveros Jr., [facility] 7/31/2019  1:53 PM Family    Alexis Preciado Jr., [facility] 7/31/2019  1:51 PM Surgical    IlanaAlexis oliveros Jr., [facility] 7/31/2019  1:50 PM Medical, Socioeconomic    IlanaAlexis oliveros Jr., [facility] 7/31/2019  1:49 PM Tobacco, Alcohol, Drug Use, Sexual Activity    IlanaAlexis oliveros Jr., [facility] 7/31/2019  1:48 PM Tobacco, Alcohol, Drug Use, Sexual Activity            Objective:   Vital Signs:   Visit Vitals  /64 (Patient Site: Left Arm, Patient Position: Sitting, Cuff Size: Adult Large)   Pulse 60   Temp 98.4  F (36.9  C) (Oral)   Resp 16   Ht 5' 3\" (1.6 m)   Wt 119 lb 4.8 oz (54.1 kg)   LMP  (LMP Unknown)   BMI 21.13 kg/m           PHYSICAL EXAM  Physical Exam   Constitutional: She is oriented to person, place, and time. She appears well-developed and well-nourished.   HENT:   Head: Normocephalic and atraumatic.   Right Ear: External ear normal.   Left Ear: External ear normal.   Nose: Nose normal.   Mouth/Throat: Oropharynx is clear and moist.   Eyes: Pupils are equal, round, and reactive to light. Conjunctivae and EOM are normal.   Neck: Normal range of motion. Neck supple. No thyromegaly present.   Cardiovascular: Normal rate, " regular rhythm, normal heart sounds and intact distal pulses.   Pulmonary/Chest: Effort normal and breath sounds normal.   Abdominal: Bowel sounds are normal. She exhibits no mass. There is no tenderness.   Musculoskeletal: She exhibits no edema.   Neurological: She is alert and oriented to person, place, and time. She has normal reflexes.   Reflex Scores:       Bicep reflexes are 2+ on the right side and 2+ on the left side.       Patellar reflexes are 2+ on the right side and 2+ on the left side.       Achilles reflexes are 2+ on the right side and 2+ on the left side.  Skin: Skin is warm and dry. Capillary refill takes less than 2 seconds.   Psychiatric: She has a normal mood and affect.   Nursing note and vitals reviewed.              Medication List           Accurate as of 7/31/19  5:42 PM. If you have any questions, ask your nurse or doctor.               CONTINUE taking these medications    buPROPion 150 MG 24 hr tablet  Also known as:  WELLBUTRIN XL  INSTRUCTIONS:  TAKE 1 TABLET (150 MG TOTAL) BY MOUTH DAILY.        JUNEL 1/20 (21) 1-20 mg-mcg per tablet  INSTRUCTIONS:  Take 1 tablet by mouth daily.  Generic drug:  norethindrone-ethinyl estradiol        levothyroxine 100 MCG tablet  Also known as:  SYNTHROID, LEVOTHROID  INSTRUCTIONS:  TAKE 1 TABLET BY MOUTH EVERY DAY        ranitidine 150 MG tablet  Also known as:  ZANTAC  INSTRUCTIONS:  TAKE 1 TABLET (150 MG TOTAL) BY MOUTH 2 (TWO) TIMES A DAY.        simvastatin 40 MG tablet  Also known as:  ZOCOR  INSTRUCTIONS:  TAKE 1 TABLET (40 MG TOTAL) BY MOUTH AT BEDTIME.        spironolactone 50 MG tablet  Also known as:  ALDACTONE  INSTRUCTIONS:  Take 1 tablet (50 mg total) by mouth daily.               Additional Screenings Completed Today:

## 2021-06-30 ENCOUNTER — COMMUNICATION - HEALTHEAST (OUTPATIENT)
Dept: FAMILY MEDICINE | Facility: CLINIC | Age: 51
End: 2021-06-30

## 2021-06-30 DIAGNOSIS — E78.5 HYPERLIPIDEMIA: ICD-10-CM

## 2021-06-30 RX ORDER — SIMVASTATIN 40 MG
TABLET ORAL
Qty: 90 TABLET | Refills: 2 | Status: SHIPPED | OUTPATIENT
Start: 2021-06-30 | End: 2022-03-27

## 2021-07-04 NOTE — TELEPHONE ENCOUNTER
Telephone Encounter by Matheus Pena RN at 6/30/2021  7:49 AM     Author: Matheus Pena RN Service: -- Author Type: Registered Nurse    Filed: 6/30/2021  7:50 AM Encounter Date: 6/30/2021 Status: Signed    : Matheus Pena, RN (Registered Nurse)       Refill Approved    Rx renewed per Medication Renewal Policy. Medication was last renewed on 5/27/20.    Matheus Pena, [facility] Triage/Med Refill 6/30/2021     Requested Prescriptions   Pending Prescriptions Disp Refills   ? simvastatin (ZOCOR) 40 MG tablet [Pharmacy Med Name: SIMVASTATIN 40 MG TABLET] 90 tablet 3     Sig: TAKE 1 TABLET (40 MG TOTAL) BY MOUTH AT BEDTIME.       Statins Refill Protocol (Hmg CoA Reductase Inhibitors) Passed - 6/30/2021  2:43 AM        Passed - PCP or prescribing provider visit in past 12 months      Last office visit with prescriber/PCP: 7/17/2020 Clemente Barrios DO OR same dept: Visit date not found OR same specialty: 3/29/2021 Philipp Lowery MD  Last physical: 7/31/2019 Last MTM visit: Visit date not found   Next visit within 3 mo: Visit date not found  Next physical within 3 mo: Visit date not found  Prescriber OR PCP: Clemente Barrios DO  Last diagnosis associated with med order: 1. Hyperlipidemia  - simvastatin (ZOCOR) 40 MG tablet [Pharmacy Med Name: SIMVASTATIN 40 MG TABLET]; TAKE 1 TABLET (40 MG TOTAL) BY MOUTH AT BEDTIME.  Dispense: 90 tablet; Refill: 3    If protocol passes may refill for 12 months if within 3 months of last provider visit (or a total of 15 months).

## 2021-09-21 ENCOUNTER — NURSE TRIAGE (OUTPATIENT)
Dept: NURSING | Facility: CLINIC | Age: 51
End: 2021-09-21

## 2021-09-21 NOTE — TELEPHONE ENCOUNTER
Triage Call:    Patient calling with severe neck pain when patient turns head. Patient has been experiencing this pain yesterday and today. Rates pain 8/10 when she turns head. Pain is on the right side of her neck and shoots down into her shoulder area.    Patient also reports dull, achy chest pain  rated 2-3/10 that has lasted greater than 5 min.    Per protocol, recommendations are for patient to Call 911 Now. RN advised patient to have  drive her to the ED as she was reluctant to go to the ED in the first place. Care advice given. Advised patient to call back if they develop any new or worsening symptoms. Patient verbalizes understanding and agrees with plan of care.    Davina Ahmadi RN  09/21/21 10:38 AM  [facility] Nurse Advisor    Reason for Disposition    Chest pain    Chest pain lasting longer than 5 minutes and ANY of the following:* Over 45 years old* Over 30 years old and at least one cardiac risk factor (e.g., diabetes, high blood pressure, high cholesterol, smoker, or strong family history of heart disease)* History of heart disease (i.e., angina, heart attack, heart failure, bypass surgery, takes nitroglycerin)* Pain is crushing, pressure-like, or heavy    Additional Information    Negative: Shock suspected (e.g., cold/pale/clammy skin, too weak to stand, low BP, rapid pulse)    Negative: Similar pain previously and it was from 'heart attack'    Negative: Similar pain previously from 'angina' and not relieved by nitroglycerin    Negative: Difficult to awaken or acting confused (e.g., disoriented, slurred speech)    Negative: Sounds like a life-threatening emergency to the triager    Negative: Shock suspected (e.g., cold/pale/clammy skin, too weak to stand, low BP, rapid pulse)    Negative: Difficult to awaken or acting confused (e.g., disoriented, slurred speech)    Negative: Passed out (i.e., fainted, collapsed and was not responding)    Negative: Severe difficulty breathing (e.g.,  struggling for each breath, speaks in single words)    Protocols used: NECK PAIN OR IILYUISOL-O-AH, CHEST PAIN-A-OH    COVID 19 Nurse Triage Plan/Patient Instructions    Please be aware that novel coronavirus (COVID-19) may be circulating in the community. If you develop symptoms such as fever, cough, or SOB or if you have concerns about the presence of another infection including coronavirus (COVID-19), please contact your health care provider or visit [email or web address].     Disposition/Instructions    ED Visit recommended. Follow protocol based instructions.     Bring Your Own Device:  Please also bring your smart device(s) (smart phones, tablets, laptops) and their charging cables for your personal use and to communicate with your care team during your visit.    Thank you for taking steps to prevent the spread of this virus.  o Limit your contact with others.  o Wear a simple mask to cover your cough.  o Wash your hands well and often.    Resources    [facility]: About COVID-19: [email or web address]    CDC: What to Do If You're Sick: www.cdc.gov/coronavirus/2019-ncov/about/steps-when-sick.html    CDC: Ending Home Isolation: www.cdc.gov/coronavirus/2019-ncov/hcp/disposition-in-home-patients.html     CDC: Caring for Someone: www.cdc.gov/coronavirus/2019-ncov/if-you-are-sick/care-for-someone.html     [facility]: Interim Guidance for Hospital Discharge to Home: www.health.[facility].[place].us/diseases/coronavirus/hcp/hospdischarge.pdf    [facility] clinical trials (COVID-19 research studies): [email or web address]     Below are the COVID-19 hotlines at the [place] Department of Health ([facility]). Interpreters are available.   o For health questions: Call 448-160-0220 or 1-206.399.8552 (7 a.m. to 7 p.m.)  o For questions about schools and childcare: Call 708-372-6096 or 1-921.897.7187 (7 a.m. to 7 p.m.)

## 2021-09-28 DIAGNOSIS — L71.9 ROSACEA: ICD-10-CM

## 2021-09-29 RX ORDER — SPIRONOLACTONE 50 MG/1
TABLET, FILM COATED ORAL
Qty: 90 TABLET | Refills: 1 | Status: SHIPPED | OUTPATIENT
Start: 2021-09-29 | End: 2022-03-27

## 2021-09-29 NOTE — TELEPHONE ENCOUNTER
"Routing refill request to provider for review/approval because:  Labs not current:  multiple    Last Written Prescription Date:  4/5/21  Last Fill Quantity: 90,  # refills: 1   Last office visit provider:  3/29/21     Requested Prescriptions   Pending Prescriptions Disp Refills     spironolactone (ALDACTONE) 50 MG tablet [Pharmacy Med Name: SPIRONOLACTONE 50 MG TABLET] 90 tablet 1     Sig: TAKE 1 TABLET BY MOUTH EVERY DAY       Diuretics (Including Combos) Protocol Failed - 9/28/2021 12:17 AM        Failed - Recent (12 mo) or future (30 days) visit within the authorizing provider's specialty     Patient has had an office visit with the authorizing provider or a provider within the authorizing providers department within the previous 12 mos or has a future within next 30 days. See \"Patient Info\" tab in inbasket, or \"Choose Columns\" in Meds & Orders section of the refill encounter.              Failed - Normal serum creatinine on file in past 12 months     Recent Labs   Lab Test 07/31/19  1052   CR 0.98              Failed - Normal serum potassium on file in past 12 months     Recent Labs   Lab Test 07/31/19  1052   POTASSIUM 4.3                    Failed - Normal serum sodium on file in past 12 months     Recent Labs   Lab Test 07/31/19  1052                 Passed - Blood pressure under 140/90 in past 12 months     BP Readings from Last 3 Encounters:   03/29/21 98/60   07/17/20 110/64   03/04/20 112/72                 Passed - Medication is active on med list        Passed - Patient is age 18 or older        Passed - No active pregancy on record        Passed - No positive pregnancy test in past 12 months             Ernestine Palumbo RN 09/29/21 7:46 AM  "

## 2021-10-17 ENCOUNTER — HEALTH MAINTENANCE LETTER (OUTPATIENT)
Age: 51
End: 2021-10-17

## 2021-11-19 ENCOUNTER — OFFICE VISIT (OUTPATIENT)
Dept: FAMILY MEDICINE | Facility: CLINIC | Age: 51
End: 2021-11-19
Payer: COMMERCIAL

## 2021-11-19 VITALS
DIASTOLIC BLOOD PRESSURE: 62 MMHG | RESPIRATION RATE: 12 BRPM | HEART RATE: 84 BPM | BODY MASS INDEX: 21.21 KG/M2 | TEMPERATURE: 98.2 F | WEIGHT: 119.7 LBS | SYSTOLIC BLOOD PRESSURE: 104 MMHG | HEIGHT: 63 IN

## 2021-11-19 DIAGNOSIS — M79.7 FIBROMYALGIA: ICD-10-CM

## 2021-11-19 DIAGNOSIS — E03.1 CONGENITAL HYPOTHYROIDISM WITHOUT GOITER: ICD-10-CM

## 2021-11-19 DIAGNOSIS — Z12.31 ENCOUNTER FOR SCREENING MAMMOGRAM FOR BREAST CANCER: ICD-10-CM

## 2021-11-19 DIAGNOSIS — R10.9 FLANK PAIN: ICD-10-CM

## 2021-11-19 DIAGNOSIS — M54.50 ACUTE RIGHT-SIDED LOW BACK PAIN WITHOUT SCIATICA: Primary | ICD-10-CM

## 2021-11-19 DIAGNOSIS — R31.9 HEMATURIA, UNSPECIFIED TYPE: ICD-10-CM

## 2021-11-19 DIAGNOSIS — Z11.59 NEED FOR HEPATITIS C SCREENING TEST: ICD-10-CM

## 2021-11-19 LAB
ALBUMIN UR-MCNC: NEGATIVE MG/DL
APPEARANCE UR: CLEAR
BILIRUB UR QL STRIP: ABNORMAL
COLOR UR AUTO: YELLOW
GLUCOSE UR STRIP-MCNC: NEGATIVE MG/DL
HGB UR QL STRIP: ABNORMAL
KETONES UR STRIP-MCNC: NEGATIVE MG/DL
LEUKOCYTE ESTERASE UR QL STRIP: NEGATIVE
NITRATE UR QL: NEGATIVE
PH UR STRIP: 6 [PH] (ref 5–8)
SP GR UR STRIP: >=1.03 (ref 1–1.03)
TSH SERPL DL<=0.005 MIU/L-ACNC: 1.86 UIU/ML (ref 0.3–5)
UROBILINOGEN UR STRIP-ACNC: 0.2 E.U./DL

## 2021-11-19 PROCEDURE — 84443 ASSAY THYROID STIM HORMONE: CPT | Performed by: FAMILY MEDICINE

## 2021-11-19 PROCEDURE — 81003 URINALYSIS AUTO W/O SCOPE: CPT | Performed by: FAMILY MEDICINE

## 2021-11-19 PROCEDURE — 99214 OFFICE O/P EST MOD 30 MIN: CPT | Performed by: FAMILY MEDICINE

## 2021-11-19 PROCEDURE — 36415 COLL VENOUS BLD VENIPUNCTURE: CPT | Performed by: FAMILY MEDICINE

## 2021-11-19 ASSESSMENT — PATIENT HEALTH QUESTIONNAIRE - PHQ9
SUM OF ALL RESPONSES TO PHQ QUESTIONS 1-9: 11
10. IF YOU CHECKED OFF ANY PROBLEMS, HOW DIFFICULT HAVE THESE PROBLEMS MADE IT FOR YOU TO DO YOUR WORK, TAKE CARE OF THINGS AT HOME, OR GET ALONG WITH OTHER PEOPLE: VERY DIFFICULT
SUM OF ALL RESPONSES TO PHQ QUESTIONS 1-9: 11

## 2021-11-19 ASSESSMENT — PAIN SCALES - GENERAL: PAINLEVEL: MODERATE PAIN (4)

## 2021-11-19 ASSESSMENT — MIFFLIN-ST. JEOR: SCORE: 1132.09

## 2021-11-19 NOTE — ASSESSMENT & PLAN NOTE
Given the recurrent right-sided pain, as well as hematuria and also pain up into the flank, and her history of having hematuria and calcium oxalate stone and today with urinalysis showing moderate amount of blood again, worried for kidney stone.    Will get CT stone search for further evaluation.  If it is negative but urinalysis still showing calcium oxalate stone will still send to the kidney [place] for further evaluation and possible testing to reduce risk of forming kidney stones.    Also if the CT scan is negative for stones will start into physical therapy.

## 2021-11-19 NOTE — ASSESSMENT & PLAN NOTE
Has only been taking Wellbutrin at 150 mg once a day.  Will increase that to 2 tabs daily, and take both in the morning as the last time she tried to take at night it did cause sleep disturbances.  Follow-up in 2 months for annual physical as well as recheck of fibromyalgia.

## 2021-11-19 NOTE — PROGRESS NOTES
Answers for HPI/ROS submitted by the patient on 11/19/2021  If you checked off any problems, how difficult have these problems made it for you to do your work, take care of things at home, or get along with other people?: Very difficult  PHQ9 TOTAL SCORE: 11        Problem List Items Addressed This Visit        Nervous and Auditory    Fibromyalgia     Has only been taking Wellbutrin at 150 mg once a day.  Will increase that to 2 tabs daily, and take both in the morning as the last time she tried to take at night it did cause sleep disturbances.  Follow-up in 2 months for annual physical as well as recheck of fibromyalgia.         Acute right-sided low back pain without sciatica - Primary     Given the recurrent right-sided pain, as well as hematuria and also pain up into the flank, and her history of having hematuria and calcium oxalate stone and today with urinalysis showing moderate amount of blood again, worried for kidney stone.    Will get CT stone search for further evaluation.  If it is negative but urinalysis still showing calcium oxalate stone will still send to the kidney [place] for further evaluation and possible testing to reduce risk of forming kidney stones.    Also if the CT scan is negative for stones will start into physical therapy.         Relevant Orders    UA reflex to Microscopic and Culture (Completed)    CT Abdomen Pelvis w/o Contrast       Endocrine    Hypothyroidism     Given the recent fatigue will recheck thyroid today and adjust as needed.         Relevant Orders    TSH with free T4 reflex      Other Visit Diagnoses     Need for hepatitis C screening test        Encounter for screening mammogram for breast cancer        Relevant Orders    MA SCREENING DIGITAL BILAT - Future  (s+30)    Hematuria, unspecified type        Relevant Orders    CT Abdomen Pelvis w/o Contrast    Flank pain        Relevant Orders    CT Abdomen Pelvis w/o Contrast        Return in about 2 months (around  "1/19/2022) for Routine preventive.    Subjective   Lolis is a 51 year old who presents for the following health issues   Lower right pain of the back that comes and goes.  No radiation.  Has a history of blood in her urine.  Does not seem to change with movement.  Sometimes will kick in while she is lying down other times when she is sitting.  Does not wake her at night.  Has moved a little bit lower from when it first started.  Originally it was slightly higher and now it is lower.  She has not noticed any gross blood in her urine.  She has felt fatigued.  Also by her PHQ-9 things are not going as well as they were previously.  She is now working from home and with that she is not exercising as much as she did when working from the office.  She has not been able to work exercise into her daily routine.  Denies any homicidal or suicidal ideation or thoughts of harming self or others.  Of note it is also been over a year since we checked her thyroid last and she states she has not missed any dosing of her medication.  However she has only been taking 1 tab of her Wellbutrin daily and not to as it is written on the bottle.    History of Present Illness       She eats 2-3 servings of fruits and vegetables daily.She consumes 1 sweetened beverage(s) daily.She exercises with enough effort to increase her heart rate 9 or less minutes per day.  She exercises with enough effort to increase her heart rate 3 or less days per week.   She is taking medications regularly.       Review of Systems   All other systems reviewed and are negative.           Objective    /62 (BP Location: Left arm, Patient Position: Sitting, Cuff Size: Adult Large)   Pulse 84   Temp 98.2  F (36.8  C) (Oral)   Resp 12   Ht 1.6 m (5' 3\")   Wt 54.3 kg (119 lb 11.2 oz)   LMP  (LMP Unknown)   Breastfeeding No   BMI 21.20 kg/m    Body mass index is 21.2 kg/m .  Physical Exam  Nursing note reviewed.   Constitutional:       General: She is not in " acute distress.     Appearance: Normal appearance. She is not ill-appearing.   HENT:      Head: Normocephalic and atraumatic.   Eyes:      Extraocular Movements: Extraocular movements intact.      Conjunctiva/sclera: Conjunctivae normal.   Cardiovascular:      Pulses: Normal pulses.      Heart sounds: Normal heart sounds.   Pulmonary:      Effort: Pulmonary effort is normal.      Breath sounds: Normal breath sounds.   Abdominal:      Tenderness: There is no right CVA tenderness or left CVA tenderness.   Skin:     Capillary Refill: Capillary refill takes less than 2 seconds.   Neurological:      Mental Status: She is alert and oriented to person, place, and time.   Psychiatric:         Attention and Perception: Attention normal.         Mood and Affect: Mood normal.         Speech: Speech normal.         Thought Content: Thought content normal.        Results for orders placed or performed in visit on 11/19/21 (from the past 24 hour(s))   UA reflex to Microscopic and Culture    Specimen: Urine, Midstream   Result Value Ref Range    Color Urine Yellow Colorless, Straw, Light Yellow, Yellow    Appearance Urine Clear Clear    Glucose Urine Negative Negative mg/dL    Bilirubin Urine Small (A) Negative    Ketones Urine Negative Negative mg/dL    Specific Gravity Urine >=1.030 1.005 - 1.030    Blood Urine Moderate (A) Negative    pH Urine 6.0 5.0 - 8.0    Protein Albumin Urine Negative Negative mg/dL    Urobilinogen Urine 0.2 0.2, 1.0 E.U./dL    Nitrite Urine Negative Negative    Leukocyte Esterase Urine Negative Negative    Narrative    QNS for micro.

## 2021-11-20 ASSESSMENT — PATIENT HEALTH QUESTIONNAIRE - PHQ9: SUM OF ALL RESPONSES TO PHQ QUESTIONS 1-9: 11

## 2021-11-22 ENCOUNTER — HOSPITAL ENCOUNTER (OUTPATIENT)
Dept: CT IMAGING | Facility: CLINIC | Age: 51
Discharge: HOME | End: 2021-11-22
Attending: FAMILY MEDICINE | Admitting: FAMILY MEDICINE
Payer: COMMERCIAL

## 2021-11-22 DIAGNOSIS — M54.50 ACUTE RIGHT-SIDED LOW BACK PAIN WITHOUT SCIATICA: ICD-10-CM

## 2021-11-22 DIAGNOSIS — R31.9 HEMATURIA, UNSPECIFIED TYPE: ICD-10-CM

## 2021-11-22 DIAGNOSIS — R10.9 FLANK PAIN: ICD-10-CM

## 2021-11-22 PROCEDURE — 74176 CT ABD & PELVIS W/O CONTRAST: CPT

## 2021-11-30 ENCOUNTER — HOSPITAL ENCOUNTER (OUTPATIENT)
Dept: MAMMOGRAPHY | Facility: CLINIC | Age: 51
Discharge: HOME | End: 2021-11-30
Attending: FAMILY MEDICINE | Admitting: FAMILY MEDICINE
Payer: COMMERCIAL

## 2021-11-30 DIAGNOSIS — Z12.31 ENCOUNTER FOR SCREENING MAMMOGRAM FOR BREAST CANCER: ICD-10-CM

## 2021-11-30 PROCEDURE — 77063 BREAST TOMOSYNTHESIS BI: CPT

## 2021-12-26 DIAGNOSIS — E03.9 HYPOTHYROIDISM: ICD-10-CM

## 2021-12-28 RX ORDER — LEVOTHYROXINE SODIUM 100 UG/1
TABLET ORAL
Qty: 90 TABLET | Refills: 3 | Status: SHIPPED | OUTPATIENT
Start: 2021-12-28 | End: 2022-11-11

## 2021-12-29 NOTE — TELEPHONE ENCOUNTER
"Last Written Prescription Date:  04/05/2021  Last Fill Quantity: 90,  # refills: 2   Last office visit provider:  11/19/2021 with Dr Barrios.    Requested Prescriptions   Pending Prescriptions Disp Refills     levothyroxine (SYNTHROID/LEVOTHROID) 100 MCG tablet [Pharmacy Med Name: LEVOTHYROXINE 100 MCG TABLET] 90 tablet 2     Sig: TAKE 1 TABLET BY MOUTH EVERY DAY       Thyroid Protocol Passed - 12/26/2021  7:13 AM        Passed - Patient is 12 years or older        Passed - Recent (12 mo) or future (30 days) visit within the authorizing provider's specialty     Patient has had an office visit with the authorizing provider or a provider within the authorizing providers department within the previous 12 mos or has a future within next 30 days. See \"Patient Info\" tab in inbasket, or \"Choose Columns\" in Meds & Orders section of the refill encounter.              Passed - Medication is active on med list        Passed - Normal TSH on file in past 12 months     Recent Labs   Lab Test 11/19/21  0818   TSH 1.86              Passed - No active pregnancy on record     If patient is pregnant or has had a positive pregnancy test, please check TSH.          Passed - No positive pregnancy test in past 12 months     If patient is pregnant or has had a positive pregnancy test, please check TSH.               Shira Martinez 12/28/21 6:17 PM  "

## 2022-03-23 DIAGNOSIS — R53.83 FATIGUE: ICD-10-CM

## 2022-03-23 DIAGNOSIS — M79.7 FIBROMYALGIA: ICD-10-CM

## 2022-03-24 RX ORDER — BUPROPION HYDROCHLORIDE 150 MG/1
TABLET ORAL
Qty: 180 TABLET | Refills: 2 | Status: SHIPPED | OUTPATIENT
Start: 2022-03-24 | End: 2022-12-19

## 2022-03-24 NOTE — TELEPHONE ENCOUNTER
"Last Written Prescription Date:  4/5/21  Last Fill Quantity: 180,  # refills: 3   Last office visit provider:  11/19/21     Requested Prescriptions   Pending Prescriptions Disp Refills     buPROPion (WELLBUTRIN XL) 150 MG 24 hr tablet [Pharmacy Med Name: BUPROPION HCL  MG TABLET] 180 tablet 3     Sig: TAKE 2 TABLETS (300 MG TOTAL) BY MOUTH DAILY.       SSRIs Protocol Passed - 3/24/2022  8:03 AM        Passed - Recent (12 mo) or future (30 days) visit within the authorizing provider's specialty     Patient has had an office visit with the authorizing provider or a provider within the authorizing providers department within the previous 12 mos or has a future within next 30 days. See \"Patient Info\" tab in inbasket, or \"Choose Columns\" in Meds & Orders section of the refill encounter.              Passed - Medication is Bupropion     If the medication is Bupropion (Wellbutrin), and the patient is taking for smoking cessation; OK to refill.          Passed - Medication is active on med list        Passed - Patient is age 18 or older        Passed - No active pregnancy on record        Passed - No positive pregnancy test in last 12 months             Matheus Pena RN 03/24/22 8:03 AM  "

## 2022-03-25 DIAGNOSIS — L71.9 ROSACEA: ICD-10-CM

## 2022-03-25 DIAGNOSIS — E78.5 HYPERLIPIDEMIA: ICD-10-CM

## 2022-03-26 NOTE — TELEPHONE ENCOUNTER
"Routing refill request to provider for review/approval because:  Labs not current:  LDL, creatinine, potassium, sodium    Simvastatin Last Written Prescription Date:  6/30/2021  Last Fill Quantity: 90,  # refills: 2   Last office visit provider:  11/19/2021 Dr. Barrios   simvastatin (ZOCOR) 40 MG tablet 90 tablet 2 6/30/2021  No   Sig: TAKE 1 TABLET (40 MG TOTAL) BY MOUTH AT BEDTIME.   Sent to pharmacy as: simvastatin 40 mg tablet (ZOCOR)   E-Prescribing Status: Receipt confirmed by pharmacy (6/30/2021  7:50 AM CDT         Spironolactone Last Written Prescription Date:  9/29/2021  Last Fill Quantity: 90,  # refills: 1   Last office visit provider:  11/19/2021 Dr. Barrios     Requested Prescriptions   Pending Prescriptions Disp Refills     simvastatin (ZOCOR) 40 MG tablet [Pharmacy Med Name: SIMVASTATIN 40 MG TABLET] 90 tablet 2     Sig: TAKE 1 TABLET (40 MG TOTAL) BY MOUTH AT BEDTIME.       Statins Protocol Failed - 3/25/2022 12:21 AM        Failed - LDL on file in past 12 months     Recent Labs   Lab Test 07/31/19  1052                Passed - No abnormal creatine kinase in past 12 months     No lab results found.             Passed - Recent (12 mo) or future (30 days) visit within the authorizing provider's specialty     Patient has had an office visit with the authorizing provider or a provider within the authorizing providers department within the previous 12 mos or has a future within next 30 days. See \"Patient Info\" tab in inbasket, or \"Choose Columns\" in Meds & Orders section of the refill encounter.              Passed - Medication is active on med list        Passed - Patient is age 18 or older        Passed - No active pregnancy on record        Passed - No positive pregnancy test in past 12 months           spironolactone (ALDACTONE) 50 MG tablet [Pharmacy Med Name: SPIRONOLACTONE 50 MG TABLET] 90 tablet 1     Sig: TAKE 1 TABLET BY MOUTH EVERY DAY       Diuretics (Including Combos) Protocol Failed - " "3/25/2022 12:21 AM        Failed - Normal serum creatinine on file in past 12 months     Recent Labs   Lab Test 07/31/19  1052   CR 0.98              Failed - Normal serum potassium on file in past 12 months     Recent Labs   Lab Test 07/31/19  1052   POTASSIUM 4.3                    Failed - Normal serum sodium on file in past 12 months     Recent Labs   Lab Test 07/31/19  1052                 Passed - Blood pressure under 140/90 in past 12 months     BP Readings from Last 3 Encounters:   11/19/21 104/62   03/29/21 98/60   07/17/20 110/64                 Passed - Recent (12 mo) or future (30 days) visit within the authorizing provider's specialty     Patient has had an office visit with the authorizing provider or a provider within the authorizing providers department within the previous 12 mos or has a future within next 30 days. See \"Patient Info\" tab in inbasket, or \"Choose Columns\" in Meds & Orders section of the refill encounter.              Passed - Medication is active on med list        Passed - Patient is age 18 or older        Passed - No active pregancy on record        Passed - No positive pregnancy test in past 12 months             Rose Arreaga RN 03/26/22 8:26 AM  "

## 2022-03-27 RX ORDER — SPIRONOLACTONE 50 MG/1
TABLET, FILM COATED ORAL
Qty: 90 TABLET | Refills: 1 | Status: SHIPPED | OUTPATIENT
Start: 2022-03-27 | End: 2022-09-23

## 2022-03-27 RX ORDER — SIMVASTATIN 40 MG
TABLET ORAL
Qty: 90 TABLET | Refills: 2 | Status: SHIPPED | OUTPATIENT
Start: 2022-03-27 | End: 2022-12-19

## 2022-06-05 ENCOUNTER — TRANSFERRED RECORDS (OUTPATIENT)
Dept: HEALTH INFORMATION MANAGEMENT | Facility: CLINIC | Age: 52
End: 2022-06-05
Payer: COMMERCIAL

## 2022-06-13 ENCOUNTER — TRANSFERRED RECORDS (OUTPATIENT)
Dept: HEALTH INFORMATION MANAGEMENT | Facility: CLINIC | Age: 52
End: 2022-06-13
Payer: COMMERCIAL

## 2022-07-24 ENCOUNTER — HEALTH MAINTENANCE LETTER (OUTPATIENT)
Age: 52
End: 2022-07-24

## 2022-08-04 ENCOUNTER — OFFICE VISIT (OUTPATIENT)
Dept: FAMILY MEDICINE | Facility: CLINIC | Age: 52
End: 2022-08-04
Payer: COMMERCIAL

## 2022-08-04 ENCOUNTER — NURSE TRIAGE (OUTPATIENT)
Dept: NURSING | Facility: CLINIC | Age: 52
End: 2022-08-04

## 2022-08-04 VITALS
WEIGHT: 118 LBS | TEMPERATURE: 97.9 F | OXYGEN SATURATION: 98 % | BODY MASS INDEX: 20.9 KG/M2 | RESPIRATION RATE: 14 BRPM | SYSTOLIC BLOOD PRESSURE: 101 MMHG | HEART RATE: 98 BPM | DIASTOLIC BLOOD PRESSURE: 62 MMHG

## 2022-08-04 DIAGNOSIS — Z51.89 VISIT FOR WOUND CHECK: Primary | ICD-10-CM

## 2022-08-04 PROCEDURE — 99213 OFFICE O/P EST LOW 20 MIN: CPT | Performed by: PHYSICIAN ASSISTANT

## 2022-08-04 NOTE — PROGRESS NOTES
Assessment & Plan:      Problem List Items Addressed This Visit    None     Visit Diagnoses     Visit for wound check    -  Primary        Medical Decision Making  Patient presents for wound check of the bilateral arms.  Examination today appears wounds healing appropriately with no signs of secondary cellulitis.  Discussed ongoing wound care and expected course of improvement.  Provided patient with reassurance.  Offered tetanus booster, but patient politely declined.  Discussed signs of worsening symptoms and when to follow-up with PCP if no symptom improvement.     Subjective:      Lolis Gaines is a 52 year old female here for evaluation of scratches on arms.  Event of injury occurred 3 to 4 days ago.  Patient was scratched by a puppy.  She had significant bleeding at that time.  She rinsed wound under the sink and then cleaned with soap.  She then went home and cleaned with hydrogen peroxide and applied topical antibiotics and a sterile dressing.  Patient wants wounds checked as she feels that they have been healing very slowly.  She otherwise states her tetanus is up-to-date within the last 10 years.  No fevers.     The following portions of the patient's history were reviewed and updated as appropriate: allergies, current medications, and problem list.     Review of Systems  Pertinent items are noted in HPI.    Allergies  Allergies   Allergen Reactions     Erythromycin Base [Erythromycin] Unknown     Morphine Nausea     IV morphine causes nausea, and anxiety; Tolerates codeine based derivatives.       Family History   Problem Relation Age of Onset     Cancer Maternal Grandmother         Uterine     Colon Polyps Mother      Cerebrovascular Disease Mother      Dementia Mother      Alzheimer Disease Mother      Hypertension Mother      Other - See Comments Father         Blood Disorder     Cerebrovascular Disease Father      Heart Disease Father      Thyroid Disease Brother      No Known Problems Sister       No Known Problems Brother      Alcoholism Sister      Hereditary Breast and Ovarian Cancer Syndrome No family hx of      Breast Cancer No family hx of      Colon Cancer No family hx of      Endometrial Cancer No family hx of      Ovarian Cancer No family hx of        Social History     Tobacco Use     Smoking status: Never Smoker     Smokeless tobacco: Never Used   Substance Use Topics     Alcohol use: Yes     Comment: Occasional        Objective:      /62   Pulse 98   Temp 97.9  F (36.6  C) (Oral)   Resp 14   Wt 53.5 kg (118 lb)   SpO2 98%   BMI 20.90 kg/m    General appearance - alert, well appearing, and in no distress and non-toxic  Skin - Superficial lacerations to the bilateral arms appear to be healing appropriately, no significant erythema, skin is normal to touch, no purulence seen    The use of Dragon/Bellabeat dictation services was used to construct the content of this note; any grammatical errors are non-intentional. Please contact the author directly if you are in need of any clarification.

## 2022-08-04 NOTE — TELEPHONE ENCOUNTER
For the cut, it wouldn't hurt to get the tetanus booster, she is due in 8 months for the normal booster, so I have ordered.     Why bleeding so much, not sure at this point. Some of her medication can cause increased bruising, but not normally bleeding. Recommend an appointment (and she is due for her annual physical) to discuss and investigate further.    Respectfully,  Marc Barrios DO

## 2022-08-04 NOTE — TELEPHONE ENCOUNTER
8/1/22, scratched by a dog on right arm. Was not a bite.    Stated she bled a lot where from the scratch. More than what she expected.  She had another incident just after that in which she bled a lot.    She's wondering why she is bleeding like this.  Her wounds are healing.    Hasn't noticed any unusual bruising.    She'd like to be seen today by pcp and understands that /[facility]'s are open as another option.  Does she need a tetanus shot?      Reason for Disposition    Patient wants to be seen    Additional Information    Negative: Shock suspected (e.g., cold/pale/clammy skin, too weak to stand, low BP, rapid pulse)    Negative: Cut on the neck, chest, back, or abdomen that may go deep (e.g., stab wound or other suspicious penetrating injury)    Negative: Major bleeding (actively dripping or spurting) that can't be stopped    Negative: Amputation    Negative: Sounds like a life-threatening emergency to the triager    Negative: High pressure injection injury (e.g., from paint gun, usually work-related)    Negative: Skin loss involves more than 10% of surface area (Note: the palm of the hand = 1%)    Negative: Skin is split open or gaping (length > 1/2 inch or 12 mm on the skin, 1/4 inch or 6 mm on the face)    Negative: Bleeding won't stop after 10 minutes of direct pressure (using correct technique)    Negative: Cut or scrape is very deep (e.g., can see bone or tendons)    Negative: Dirt in the wound and not removed after 15 minutes of scrubbing    Negative: Wound causes numbness (i.e., loss of sensation)    Negative: Wound causes weakness (i.e., decreased ability to move hand, finger, toe)    Negative: Sounds like a serious injury to the triager    Negative: Looks infected (fever, spreading redness, pus, or red streak)    Negative: Raised bruise with size > 2 inches (5 cm) that is getting bigger    Negative: No prior tetanus shots (or is not fully vaccinated) and any wound (e.g., cut or scrape)    Negative: SEVERE  pain (e.g., excruciating)    Protocols used: SKIN INJURY-A-OH    Gabriela MO RN [facility]

## 2022-09-23 DIAGNOSIS — L71.9 ROSACEA: ICD-10-CM

## 2022-09-23 RX ORDER — SPIRONOLACTONE 50 MG/1
TABLET, FILM COATED ORAL
Qty: 90 TABLET | Refills: 1 | Status: SHIPPED | OUTPATIENT
Start: 2022-09-23 | End: 2022-11-07 | Stop reason: SINTOL

## 2022-09-23 NOTE — TELEPHONE ENCOUNTER
"Routing refill request to provider for review/approval because:  Labs not current:  K, Cr, Na    Last Written Prescription Date: 3/27/22  Last Fill Quantity: 90,  # refills: 1  Last office visit provider: 11/19/21    Upcoming appointment 11/07/22    Requested Prescriptions   Pending Prescriptions Disp Refills     spironolactone (ALDACTONE) 50 MG tablet [Pharmacy Med Name: SPIRONOLACTONE 50 MG TABLET] 90 tablet 1     Sig: TAKE 1 TABLET BY MOUTH EVERY DAY       Diuretics (Including Combos) Protocol Failed - 9/23/2022  1:40 AM        Failed - Normal serum creatinine on file in past 12 months     Recent Labs   Lab Test 07/31/19  1052   CR 0.98              Failed - Normal serum potassium on file in past 12 months     Recent Labs   Lab Test 07/31/19  1052   POTASSIUM 4.3                    Failed - Normal serum sodium on file in past 12 months     Recent Labs   Lab Test 07/31/19  1052                 Passed - Blood pressure under 140/90 in past 12 months     BP Readings from Last 3 Encounters:   08/04/22 101/62   11/19/21 104/62   03/29/21 98/60                 Passed - Recent (12 mo) or future (30 days) visit within the authorizing provider's specialty     Patient has had an office visit with the authorizing provider or a provider within the authorizing providers department within the previous 12 mos or has a future within next 30 days. See \"Patient Info\" tab in inbasket, or \"Choose Columns\" in Meds & Orders section of the refill encounter.              Passed - Medication is active on med list        Passed - Patient is age 18 or older        Passed - No active pregancy on record        Passed - No positive pregnancy test in past 12 months           Colin Alaniz RN  [facility]     "

## 2022-10-01 ENCOUNTER — HEALTH MAINTENANCE LETTER (OUTPATIENT)
Age: 52
End: 2022-10-01

## 2022-10-22 ENCOUNTER — OFFICE VISIT (OUTPATIENT)
Dept: FAMILY MEDICINE | Facility: CLINIC | Age: 52
End: 2022-10-22
Payer: COMMERCIAL

## 2022-10-22 VITALS
TEMPERATURE: 97.9 F | WEIGHT: 121 LBS | DIASTOLIC BLOOD PRESSURE: 65 MMHG | HEART RATE: 82 BPM | RESPIRATION RATE: 14 BRPM | BODY MASS INDEX: 21.43 KG/M2 | OXYGEN SATURATION: 98 % | SYSTOLIC BLOOD PRESSURE: 110 MMHG

## 2022-10-22 DIAGNOSIS — B37.31 VAGINAL CANDIDIASIS: Primary | ICD-10-CM

## 2022-10-22 DIAGNOSIS — R30.0 BURNING WITH URINATION: ICD-10-CM

## 2022-10-22 DIAGNOSIS — R31.29 OTHER MICROSCOPIC HEMATURIA: ICD-10-CM

## 2022-10-22 DIAGNOSIS — R39.89 URINARY PROBLEM: ICD-10-CM

## 2022-10-22 LAB
ALBUMIN UR-MCNC: NEGATIVE MG/DL
APPEARANCE UR: CLEAR
BACTERIA #/AREA URNS HPF: ABNORMAL /HPF
BILIRUB UR QL STRIP: NEGATIVE
CLUE CELLS: ABNORMAL
COLOR UR AUTO: YELLOW
GLUCOSE UR STRIP-MCNC: NEGATIVE MG/DL
HGB UR QL STRIP: ABNORMAL
KETONES UR STRIP-MCNC: NEGATIVE MG/DL
LEUKOCYTE ESTERASE UR QL STRIP: NEGATIVE
MUCOUS THREADS #/AREA URNS LPF: PRESENT /LPF
NITRATE UR QL: NEGATIVE
PH UR STRIP: 6.5 [PH] (ref 5–8)
RBC #/AREA URNS AUTO: ABNORMAL /HPF
SP GR UR STRIP: 1.01 (ref 1–1.03)
SQUAMOUS #/AREA URNS AUTO: ABNORMAL /LPF
TRICHOMONAS, WET PREP: ABNORMAL
UROBILINOGEN UR STRIP-ACNC: 0.2 E.U./DL
WBC #/AREA URNS AUTO: ABNORMAL /HPF
WBC'S/HIGH POWER FIELD, WET PREP: ABNORMAL
YEAST, WET PREP: PRESENT

## 2022-10-22 PROCEDURE — 87210 SMEAR WET MOUNT SALINE/INK: CPT | Performed by: NURSE PRACTITIONER

## 2022-10-22 PROCEDURE — 81001 URINALYSIS AUTO W/SCOPE: CPT

## 2022-10-22 PROCEDURE — 90471 IMMUNIZATION ADMIN: CPT | Performed by: NURSE PRACTITIONER

## 2022-10-22 PROCEDURE — 99213 OFFICE O/P EST LOW 20 MIN: CPT | Mod: 25 | Performed by: NURSE PRACTITIONER

## 2022-10-22 PROCEDURE — 90686 IIV4 VACC NO PRSV 0.5 ML IM: CPT | Performed by: NURSE PRACTITIONER

## 2022-10-22 RX ORDER — FLUCONAZOLE 150 MG/1
150 TABLET ORAL ONCE
Qty: 2 TABLET | Refills: 0 | Status: SHIPPED | OUTPATIENT
Start: 2022-10-22 | End: 2022-10-22

## 2022-10-22 NOTE — PATIENT INSTRUCTIONS
Your swab showed yeast infection.    Take Diflucan tablet now.  Second tablet can be taken in 3 days if you still have symptoms.  Otherwise no need to take a second tablet.    Today's blood in urine could be due to yeast infection.  Do recommend doing a repeat urine to ensure hematuria has gone after treatment is completed and you have no symptoms.  You can send a [employer] message to your primary care provider about this and probably do it without being seen.  If you continue to have blood after you are feeling better, I do recommend seeing a urologist because this has been going on a long time.

## 2022-10-22 NOTE — PROGRESS NOTES
Assessment & Plan     Urinary problem    - UA macro with reflex to Microscopic and Culture - Clinc Collect  - Urine Microscopic    Burning with urination    - Wet prep    Vaginal candidiasis    - fluconazole (DIFLUCAN) 150 MG tablet  Dispense: 2 tablet; Refill: 0    Other microscopic hematuria       Patient with urgency, burning, lower abdomen pain and dysuria for the last 1 to 2 days.    UA was not consistent with UTI.    Wet prep did show yeast.    Noted that patient has had hematuria on all of her recent UAs.  Does take birth control pills without a break.  Normally does not spot.  Has not seen urology about this.    Today's hematuria could be due to yeast infection.  Do recommend doing a repeat urine to ensure hematuria has gone after treatment is completed and patient is asymptomatic.  This can be done through PCP office.    Updated flu shot per request.          Return in about 1 week (around 10/29/2022) for If no better.    Sharron Vanegas [facility]    Suad Danielle is a 52 year old female who presents to clinic today for the following health issues:  Chief Complaint   Patient presents with     Urinary Problem     Burning feeling needing to urinate lower right side pain not sure if related      HPI    Urinary symptoms starting 1-2 days ago. Worse this morning.      Has had ovarian cyst.  Pain rated 1-2/10.      Symptoms Include:   Dysuria, urgency [x], lower abd pain.  Takes back to back birth control to prevent menses.  Spotted with being 12 hours late with spill.      Denies:  Flank pain, fevers     History includes: Noted to have hematuria in the last several urines checked starting in 2018.  Did have a CT abdomen pelvis to evaluate for stone in 2021 which was negative and no reason for hematuria was found.  CT urogram was recommended for persistent hematuria. Hasn't seen urologist.            Review of Systems  See HPI       Objective    /65   Pulse  82   Temp 97.9  F (36.6  C) (Oral)   Resp 14   Wt 54.9 kg (121 lb)   SpO2 98%   BMI 21.43 kg/m    Physical Exam  Constitutional:       General: She is not in acute distress.     Appearance: She is well-developed and well-nourished.   Eyes:      General:         Right eye: No discharge.         Left eye: No discharge.      Conjunctiva/sclera: Conjunctivae normal.   Cardiovascular:      Pulses: Intact distal pulses.   Pulmonary:      Effort: Pulmonary effort is normal.   Musculoskeletal:         General: Normal range of motion.   Skin:     General: Skin is warm and dry.   Neurological:      Mental Status: She is alert and oriented to person, place, and time.   Psychiatric:         Mood and Affect: Mood and affect and mood normal.         Behavior: Behavior normal.         Thought Content: Thought content normal.         Judgment: Judgment normal.            Results for orders placed or performed in visit on 10/22/22 (from the past 24 hour(s))   UA macro with reflex to Microscopic and Culture - Clinc Collect    Specimen: Urine, Midstream   Result Value Ref Range    Color Urine Yellow Colorless, Straw, Light Yellow, Yellow    Appearance Urine Clear Clear    Glucose Urine Negative Negative, 1000 , >=2000 mg/dL    Bilirubin Urine Negative Negative    Ketones Urine Negative Negative, 160  mg/dL    Specific Gravity Urine 1.015 1.005 - 1.030    Blood Urine Moderate (A) Negative    pH Urine 6.5 5.0 - 8.0    Protein Albumin Urine Negative Negative, 300 , >=2000 mg/dL    Urobilinogen Urine 0.2 0.2, 1.0 E.U./dL    Nitrite Urine Negative Negative    Leukocyte Esterase Urine Negative Negative   Urine Microscopic   Result Value Ref Range    Bacteria Urine Few (A) None Seen /HPF    RBC Urine 2-5 (A) 0-2 /HPF /HPF    WBC Urine None Seen 0-5 /HPF /HPF    Squamous Epithelials Urine Few (A) None Seen /LPF    Mucus Urine Present (A) None Seen /LPF    Narrative    Urine Culture not indicated   Wet prep    Specimen: Vagina; Swab    Result Value Ref Range    Trichomonas Absent Absent    Yeast Present (A) Absent    Clue Cells Absent Absent    WBCs/high power field 1+ (A) None

## 2022-11-07 ENCOUNTER — OFFICE VISIT (OUTPATIENT)
Dept: FAMILY MEDICINE | Facility: CLINIC | Age: 52
End: 2022-11-07
Payer: COMMERCIAL

## 2022-11-07 VITALS
BODY MASS INDEX: 21.21 KG/M2 | HEART RATE: 56 BPM | SYSTOLIC BLOOD PRESSURE: 104 MMHG | OXYGEN SATURATION: 98 % | TEMPERATURE: 98.1 F | HEIGHT: 63 IN | RESPIRATION RATE: 12 BRPM | WEIGHT: 119.7 LBS | DIASTOLIC BLOOD PRESSURE: 60 MMHG

## 2022-11-07 DIAGNOSIS — Z12.31 VISIT FOR SCREENING MAMMOGRAM: ICD-10-CM

## 2022-11-07 DIAGNOSIS — Z11.59 NEED FOR HEPATITIS C SCREENING TEST: ICD-10-CM

## 2022-11-07 DIAGNOSIS — E78.01 FAMILIAL HYPERCHOLESTEROLEMIA: ICD-10-CM

## 2022-11-07 DIAGNOSIS — E03.1 CONGENITAL HYPOTHYROIDISM WITHOUT GOITER: ICD-10-CM

## 2022-11-07 DIAGNOSIS — Z12.31 ENCOUNTER FOR SCREENING MAMMOGRAM FOR BREAST CANCER: ICD-10-CM

## 2022-11-07 DIAGNOSIS — Z13.1 DIABETES MELLITUS SCREENING: ICD-10-CM

## 2022-11-07 DIAGNOSIS — Z00.00 ROUTINE GENERAL MEDICAL EXAMINATION AT A HEALTH CARE FACILITY: Primary | ICD-10-CM

## 2022-11-07 DIAGNOSIS — K21.9 GASTROESOPHAGEAL REFLUX DISEASE WITHOUT ESOPHAGITIS: ICD-10-CM

## 2022-11-07 DIAGNOSIS — N93.9 VAGINAL BLEEDING: ICD-10-CM

## 2022-11-07 DIAGNOSIS — R31.9 HEMATURIA, UNSPECIFIED TYPE: ICD-10-CM

## 2022-11-07 LAB
ALBUMIN SERPL BCG-MCNC: 4.2 G/DL (ref 3.5–5.2)
ALBUMIN UR-MCNC: NEGATIVE MG/DL
ALP SERPL-CCNC: 77 U/L (ref 35–104)
ALT SERPL W P-5'-P-CCNC: 9 U/L (ref 10–35)
ANION GAP SERPL CALCULATED.3IONS-SCNC: 13 MMOL/L (ref 7–15)
APPEARANCE UR: CLEAR
AST SERPL W P-5'-P-CCNC: 16 U/L (ref 10–35)
BACTERIA #/AREA URNS HPF: ABNORMAL /HPF
BILIRUB SERPL-MCNC: 0.2 MG/DL
BILIRUB UR QL STRIP: NEGATIVE
BUN SERPL-MCNC: 12.5 MG/DL (ref 6–20)
CALCIUM SERPL-MCNC: 8.5 MG/DL (ref 8.6–10)
CHLORIDE SERPL-SCNC: 104 MMOL/L (ref 98–107)
CHOLEST SERPL-MCNC: 223 MG/DL
COLOR UR AUTO: YELLOW
CREAT SERPL-MCNC: 1 MG/DL (ref 0.51–0.95)
DEPRECATED HCO3 PLAS-SCNC: 22 MMOL/L (ref 22–29)
ERYTHROCYTE [DISTWIDTH] IN BLOOD BY AUTOMATED COUNT: 13.6 % (ref 10–15)
FERRITIN SERPL-MCNC: 32 NG/ML (ref 11–328)
GFR SERPL CREATININE-BSD FRML MDRD: 68 ML/MIN/1.73M2
GLUCOSE SERPL-MCNC: 85 MG/DL (ref 70–99)
GLUCOSE UR STRIP-MCNC: NEGATIVE MG/DL
HCT VFR BLD AUTO: 38.6 % (ref 35–47)
HDLC SERPL-MCNC: 71 MG/DL
HGB BLD-MCNC: 12.9 G/DL (ref 11.7–15.7)
HGB UR QL STRIP: ABNORMAL
HOLD SPECIMEN: NORMAL
IRON BINDING CAPACITY (ROCHE): 481 UG/DL (ref 240–430)
IRON SATN MFR SERPL: 22 % (ref 15–46)
IRON SERPL-MCNC: 104 UG/DL (ref 37–145)
KETONES UR STRIP-MCNC: NEGATIVE MG/DL
LDLC SERPL CALC-MCNC: 117 MG/DL
LEUKOCYTE ESTERASE UR QL STRIP: NEGATIVE
MCH RBC QN AUTO: 30.7 PG (ref 26.5–33)
MCHC RBC AUTO-ENTMCNC: 33.4 G/DL (ref 31.5–36.5)
MCV RBC AUTO: 92 FL (ref 78–100)
NITRATE UR QL: NEGATIVE
NONHDLC SERPL-MCNC: 152 MG/DL
PH UR STRIP: 7.5 [PH] (ref 5–8)
PLATELET # BLD AUTO: 292 10E3/UL (ref 150–450)
POTASSIUM SERPL-SCNC: 4.3 MMOL/L (ref 3.4–5.3)
PROT SERPL-MCNC: 6.6 G/DL (ref 6.4–8.3)
RBC # BLD AUTO: 4.2 10E6/UL (ref 3.8–5.2)
RBC #/AREA URNS AUTO: ABNORMAL /HPF
SODIUM SERPL-SCNC: 139 MMOL/L (ref 136–145)
SP GR UR STRIP: 1.02 (ref 1–1.03)
SQUAMOUS #/AREA URNS AUTO: ABNORMAL /LPF
TRIGL SERPL-MCNC: 175 MG/DL
TSH SERPL DL<=0.005 MIU/L-ACNC: 3.82 UIU/ML (ref 0.3–4.2)
UROBILINOGEN UR STRIP-ACNC: 0.2 E.U./DL
WBC # BLD AUTO: 6.3 10E3/UL (ref 4–11)
WBC #/AREA URNS AUTO: ABNORMAL /HPF

## 2022-11-07 PROCEDURE — 86803 HEPATITIS C AB TEST: CPT | Performed by: FAMILY MEDICINE

## 2022-11-07 PROCEDURE — 99396 PREV VISIT EST AGE 40-64: CPT | Performed by: FAMILY MEDICINE

## 2022-11-07 PROCEDURE — 36415 COLL VENOUS BLD VENIPUNCTURE: CPT | Performed by: FAMILY MEDICINE

## 2022-11-07 PROCEDURE — 80053 COMPREHEN METABOLIC PANEL: CPT | Performed by: FAMILY MEDICINE

## 2022-11-07 PROCEDURE — 83550 IRON BINDING TEST: CPT | Performed by: FAMILY MEDICINE

## 2022-11-07 PROCEDURE — 83540 ASSAY OF IRON: CPT | Performed by: FAMILY MEDICINE

## 2022-11-07 PROCEDURE — 82728 ASSAY OF FERRITIN: CPT | Performed by: FAMILY MEDICINE

## 2022-11-07 PROCEDURE — 84443 ASSAY THYROID STIM HORMONE: CPT | Performed by: FAMILY MEDICINE

## 2022-11-07 PROCEDURE — 81001 URINALYSIS AUTO W/SCOPE: CPT | Performed by: FAMILY MEDICINE

## 2022-11-07 PROCEDURE — 80061 LIPID PANEL: CPT | Performed by: FAMILY MEDICINE

## 2022-11-07 PROCEDURE — 85027 COMPLETE CBC AUTOMATED: CPT | Performed by: FAMILY MEDICINE

## 2022-11-07 PROCEDURE — 99213 OFFICE O/P EST LOW 20 MIN: CPT | Mod: 25 | Performed by: FAMILY MEDICINE

## 2022-11-07 ASSESSMENT — ENCOUNTER SYMPTOMS
HEMATOCHEZIA: 0
DIZZINESS: 0
EYE PAIN: 0
COUGH: 0
DYSURIA: 0
PARESTHESIAS: 0
BREAST MASS: 0
FEVER: 0
ARTHRALGIAS: 1
CHILLS: 0
FREQUENCY: 1
DIARRHEA: 0
NERVOUS/ANXIOUS: 0
SORE THROAT: 0
JOINT SWELLING: 0
MYALGIAS: 1
HEARTBURN: 0
PALPITATIONS: 0
WEAKNESS: 0
CONSTIPATION: 0
HEMATURIA: 1
ABDOMINAL PAIN: 1
NAUSEA: 0
HEADACHES: 1
SHORTNESS OF BREATH: 0

## 2022-11-07 NOTE — ASSESSMENT & PLAN NOTE
Recheck TSH.  Given her symptoms, very well could be that it is the combination of menopause, thyroid, as well as being on oral birth control pills that is causing a great many of her symptoms.  With the vaginal bleeding will get pelvic ultrasound, if that is otherwise normal will send to gynecology for further discussion of changing from OCP over to hormone replacement therapy, and adjust her thyroid dose as indicated by lab.

## 2022-11-07 NOTE — ASSESSMENT & PLAN NOTE
Has been on continues to take simvastatin 40 mg daily.  Will recheck lipid profile and AST as per guidelines.

## 2022-11-07 NOTE — PROGRESS NOTES
SUBJECTIVE:   CC: Lolis is an 52 year old who presents for preventive health visit.       Patient has been advised of split billing requirements and indicates understanding: Yes     Denies any chest pain, shortness of breath, dyspnea exertion, palpitations, nausea or vomiting.  Denies any changes in vision or hearing, or urinary or bowel habits.  However she has developed some vaginal bleeding.  She has been on continuous OCPs for quite a few years and has not had spotting or abnormalities.  She recently was treated for a vaginal yeast infection and the symptoms seem to have cleared.  But then she developed the vaginal bleeding.  This could be from the fact that she is on continuous therapy and does not take a break, versus the fact that she is on oral contraceptives and is now postmenopausal.    Healthy Habits:     Getting at least 3 servings of Calcium per day:  Yes    Bi-annual eye exam:  NO    Dental care twice a year:  Yes    Sleep apnea or symptoms of sleep apnea:  Daytime drowsiness    Diet:  Regular (no restrictions)    Frequency of exercise:  None    Taking medications regularly:  Yes    Barriers to taking medications:  Side effects    Medication side effects:  Other    PHQ-2 Total Score: 2    Additional concerns today:  Yes          Today's PHQ-2 Score:   PHQ-2 ( 1999 Pf[employer]) 11/7/2022   Q1: Little interest or pleasure in doing things 1   Q2: Feeling down, depressed or hopeless 1   PHQ-2 Score 2   Q1: Little interest or pleasure in doing things Several days   Q2: Feeling down, depressed or hopeless Several days   PHQ-2 Score 2       Abuse: Current or Past (Physical, Sexual or Emotional) - No  Do you feel safe in your environment? Yes        Social History     Tobacco Use     Smoking status: Never     Smokeless tobacco: Never   Substance Use Topics     Alcohol use: Not Currently       Alcohol Use 11/7/2022   Prescreen: >3 drinks/day or >7 drinks/week? No     Reviewed orders with patient.  Reviewed health  maintenance and updated orders accordingly - Yes  Lab work is in process    Breast Cancer Screening:    FHS-7:   Breast CA Risk Assessment (FHS-7) 11/30/2021 11/7/2022   Did any of your first-degree relatives have breast or ovarian cancer? No Unknown   Did any of your relatives have bilateral breast cancer? No No   Did any man in your family have breast cancer? No No   Did any woman in your family have breast and ovarian cancer? No Yes   Did any woman in your family have breast cancer before age 50 y? No No   Do you have 2 or more relatives with breast and/or ovarian cancer? No No   Do you have 2 or more relatives with breast and/or bowel cancer? No No     Mammogram Screening: Recommended annual mammography  Pertinent mammograms are reviewed under the imaging tab.    History of abnormal Pap smear: NO - age 30-65 PAP every 5 years with negative HPV co-testing recommended  PAP / HPV 1/15/2019 12/17/2014   PAP - Negative for squamous intraepithelial lesion or malignancy  Electronically signed by Dasha Velasquez CT (ASCP) on 12/29/2014 at 11:32 AM     HPV See Scanned Report -     Reviewed and updated as needed this visit by clinical staff   Tobacco  Allergies  Meds  Problems  Med Hx  Surg Hx  Fam Hx  Soc   Hx        Reviewed and updated as needed this visit by Provider   Tobacco  Allergies  Meds  Problems  Med Hx  Surg Hx  Fam Hx           Review of Systems   Constitutional: Negative for chills and fever.   HENT: Negative for congestion, ear pain, hearing loss and sore throat.    Eyes: Negative for pain and visual disturbance.   Respiratory: Negative for cough and shortness of breath.    Cardiovascular: Negative for chest pain, palpitations and peripheral edema.   Gastrointestinal: Positive for abdominal pain. Negative for constipation, diarrhea, heartburn, hematochezia and nausea.   Breasts:  Negative for tenderness, breast mass and discharge.   Genitourinary: Positive for frequency, hematuria, pelvic  "pain, urgency and vaginal bleeding. Negative for dysuria, genital sores and vaginal discharge.   Musculoskeletal: Positive for arthralgias and myalgias. Negative for joint swelling.   Skin: Negative for rash.   Neurological: Positive for headaches. Negative for dizziness, weakness and paresthesias.   Psychiatric/Behavioral: Negative for mood changes. The patient is not nervous/anxious.         OBJECTIVE:   /60 (BP Location: Left arm, Patient Position: Sitting, Cuff Size: Adult Large)   Pulse 56   Temp 98.1  F (36.7  C) (Oral)   Resp 12   Ht 1.6 m (5' 3\")   Wt 54.3 kg (119 lb 11.2 oz)   LMP  (LMP Unknown)   SpO2 98%   Breastfeeding No   BMI 21.20 kg/m    Physical Exam  Vitals and nursing note reviewed.   Constitutional:       General: She is not in acute distress.     Appearance: Normal appearance.   HENT:      Head: Normocephalic and atraumatic.      Right Ear: Tympanic membrane, ear canal and external ear normal.      Left Ear: Tympanic membrane, ear canal and external ear normal.      Nose: Nose normal.      Mouth/Throat:      Mouth: Mucous membranes are moist.      Pharynx: Oropharynx is clear. No oropharyngeal exudate.   Eyes:      General: No scleral icterus.     Extraocular Movements: Extraocular movements intact.      Conjunctiva/sclera: Conjunctivae normal.   Neck:      Vascular: No carotid bruit.   Cardiovascular:      Rate and Rhythm: Normal rate and regular rhythm.      Pulses: Normal pulses.      Heart sounds: Normal heart sounds. No murmur heard.    No friction rub. No gallop.   Pulmonary:      Effort: Pulmonary effort is normal.      Breath sounds: Normal breath sounds. No wheezing, rhonchi or rales.   Musculoskeletal:         General: No swelling. Normal range of motion.      Cervical back: Normal range of motion.      Right lower leg: No edema.      Left lower leg: No edema.   Skin:     General: Skin is warm and dry.      Capillary Refill: Capillary refill takes less than 2 seconds.    "   Findings: No rash.   Neurological:      General: No focal deficit present.      Mental Status: She is alert and oriented to person, place, and time.      Cranial Nerves: No cranial nerve deficit.      Gait: Gait is intact. Gait normal.      Deep Tendon Reflexes: Reflexes normal.      Reflex Scores:       Bicep reflexes are 2+ on the right side and 2+ on the left side.       Patellar reflexes are 2+ on the right side and 2+ on the left side.  Psychiatric:         Mood and Affect: Mood normal.         Thought Content: Thought content normal.       ASSESSMENT/PLAN:     Problem List Items Addressed This Visit        Medium    Hyperlipidemia     Has been on continues to take simvastatin 40 mg daily.  Will recheck lipid profile and AST as per guidelines.         Relevant Orders    Lipid panel reflex to direct LDL Non-fasting    Comprehensive metabolic panel    Hypothyroidism     Recheck TSH.  Given her symptoms, very well could be that it is the combination of menopause, thyroid, as well as being on oral birth control pills that is causing a great many of her symptoms.  With the vaginal bleeding will get pelvic ultrasound, if that is otherwise normal will send to gynecology for further discussion of changing from OCP over to hormone replacement therapy, and adjust her thyroid dose as indicated by lab.         Relevant Orders    CBC with Platelets and Reflex to Iron Studies (Completed)    TSH with free T4 reflex    Iron & Iron Binding Capacity    Ferritin    Esophageal Reflux     Controlled with diet, exercise and behavioral.  We will plan to continue.         Relevant Orders    Comprehensive metabolic panel    CBC with Platelets and Reflex to Iron Studies (Completed)    Iron & Iron Binding Capacity    Ferritin   Other Visit Diagnoses     Routine general medical examination at a health care facility    -  Primary    Relevant Orders    PRIMARY CARE FOLLOW-UP SCHEDULING    Need for hepatitis C screening test         "Relevant Orders    Hepatitis C Screen Reflex to HCV RNA Quant and Genotype    Visit for screening mammogram        Relevant Orders    MA SCREENING DIGITAL BILAT - Future  (s+30)    Encounter for screening mammogram for breast cancer        Relevant Orders    MA SCREENING DIGITAL BILAT - Future  (s+30)    Diabetes mellitus screening        Relevant Orders    Comprehensive metabolic panel    Hematuria, unspecified type        Relevant Orders    UA Macro with Reflex to Micro and Culture - lab collect (Completed)    Urine Microscopic (Completed)    Vaginal bleeding        Relevant Orders    CBC with Platelets and Reflex to Iron Studies (Completed)    US Pelvic Complete with Transvaginal    Iron & Iron Binding Capacity    Ferritin          Patient has been advised of split billing requirements and indicates understanding: Yes      COUNSELING:  Reviewed preventive health counseling, as reflected in patient instructions       Regular exercise       Healthy diet/nutrition       Contraception       Osteoporosis prevention/bone health       Consider Hep C screening for all patients one time for ages 18-79 years       (Pham)menopause management       Advance Care Planning    Estimated body mass index is 21.2 kg/m  as calculated from the following:    Height as of this encounter: 1.6 m (5' 3\").    Weight as of this encounter: 54.3 kg (119 lb 11.2 oz).        She reports that she has never smoked. She has never used smokeless tobacco.      Counseling Resources:  ATP IV Guidelines  Pooled Cohorts Equation Calculator  Breast Cancer Risk Calculator  BRCA-Related Cancer Risk Assessment: FHS-7 Tool  FRAX Risk Assessment  ICSI Preventive Guidelines  Dietary Guidelines for Americans, 2010  USDA's MyPlate  ASA Prophylaxis  Lung CA Screening    Clemente Barrios, [facility]  "

## 2022-11-08 LAB — HCV AB SERPL QL IA: NONREACTIVE

## 2022-11-10 ENCOUNTER — MYC MEDICAL ADVICE (OUTPATIENT)
Dept: FAMILY MEDICINE | Facility: CLINIC | Age: 52
End: 2022-11-10

## 2022-11-10 DIAGNOSIS — N94.3 PREMENSTRUAL TENSION SYNDROME: ICD-10-CM

## 2022-11-10 DIAGNOSIS — E03.9 HYPOTHYROIDISM, UNSPECIFIED TYPE: Primary | ICD-10-CM

## 2022-11-11 ENCOUNTER — HOSPITAL ENCOUNTER (OUTPATIENT)
Dept: ULTRASOUND IMAGING | Facility: CLINIC | Age: 52
Discharge: HOME | End: 2022-11-11
Attending: FAMILY MEDICINE | Admitting: FAMILY MEDICINE
Payer: COMMERCIAL

## 2022-11-11 DIAGNOSIS — N93.9 VAGINAL BLEEDING: ICD-10-CM

## 2022-11-11 PROCEDURE — 76856 US EXAM PELVIC COMPLETE: CPT

## 2022-11-11 RX ORDER — LEVOTHYROXINE SODIUM 112 UG/1
112 TABLET ORAL DAILY
Qty: 90 TABLET | Refills: 3 | Status: SHIPPED | OUTPATIENT
Start: 2022-11-11 | End: 2024-01-22

## 2022-11-11 NOTE — ASSESSMENT & PLAN NOTE
TSH goal less than 2.7.  Not at goal.  Will increase treatment from 100mcg to 112 mcg daily and recheck thyroid levels in 6 weeks

## 2022-11-11 NOTE — ASSESSMENT & PLAN NOTE
Noted age 52, most likely need to change from oral birth control over-the-counter replacement therapy or, folate.  Will send to gynecology for further discussion.

## 2022-11-11 NOTE — TELEPHONE ENCOUNTER
Problem List Items Addressed This Visit        Medium    Premenstrual Disorder     Noted age 52, most likely need to change from oral birth control over-the-counter replacement therapy or, folate.  Will send to gynecology for further discussion.         Relevant Orders    Ob/Gyn Referral    Hypothyroidism - Primary     TSH goal less than 2.7.  Not at goal.  Will increase treatment from 100mcg to 112 mcg daily and recheck thyroid levels in 6 weeks         Relevant Medications    levothyroxine (SYNTHROID/LEVOTHROID) 112 MCG tablet    Other Relevant Orders    TSH    PRIMARY CARE FOLLOW-UP SCHEDULING

## 2022-11-23 ENCOUNTER — OFFICE VISIT (OUTPATIENT)
Dept: FAMILY MEDICINE | Facility: CLINIC | Age: 52
End: 2022-11-23
Payer: COMMERCIAL

## 2022-11-23 VITALS
HEART RATE: 105 BPM | BODY MASS INDEX: 21.08 KG/M2 | WEIGHT: 119 LBS | RESPIRATION RATE: 14 BRPM | TEMPERATURE: 98.4 F | OXYGEN SATURATION: 98 % | SYSTOLIC BLOOD PRESSURE: 109 MMHG | DIASTOLIC BLOOD PRESSURE: 73 MMHG

## 2022-11-23 DIAGNOSIS — M79.10 MYALGIA: ICD-10-CM

## 2022-11-23 DIAGNOSIS — R50.9 FEVER AND CHILLS: ICD-10-CM

## 2022-11-23 DIAGNOSIS — J11.1 INFLUENZA-LIKE ILLNESS: Primary | ICD-10-CM

## 2022-11-23 DIAGNOSIS — U07.1 INFECTION DUE TO 2019 NOVEL CORONAVIRUS: ICD-10-CM

## 2022-11-23 DIAGNOSIS — R07.0 THROAT PAIN: ICD-10-CM

## 2022-11-23 LAB
DEPRECATED S PYO AG THROAT QL EIA: NEGATIVE
FLUAV AG SPEC QL IA: NEGATIVE
FLUBV AG SPEC QL IA: NEGATIVE
GROUP A STREP BY PCR: NOT DETECTED
SARS-COV-2 RNA RESP QL NAA+PROBE: POSITIVE

## 2022-11-23 PROCEDURE — U0005 INFEC AGEN DETEC AMPLI PROBE: HCPCS | Performed by: FAMILY MEDICINE

## 2022-11-23 PROCEDURE — 87651 STREP A DNA AMP PROBE: CPT | Performed by: FAMILY MEDICINE

## 2022-11-23 PROCEDURE — 87804 INFLUENZA ASSAY W/OPTIC: CPT | Performed by: FAMILY MEDICINE

## 2022-11-23 PROCEDURE — U0003 INFECTIOUS AGENT DETECTION BY NUCLEIC ACID (DNA OR RNA); SEVERE ACUTE RESPIRATORY SYNDROME CORONAVIRUS 2 (SARS-COV-2) (CORONAVIRUS DISEASE [COVID-19]), AMPLIFIED PROBE TECHNIQUE, MAKING USE OF HIGH THROUGHPUT TECHNOLOGIES AS DESCRIBED BY CMS-2020-01-R: HCPCS | Performed by: FAMILY MEDICINE

## 2022-11-23 PROCEDURE — 99213 OFFICE O/P EST LOW 20 MIN: CPT | Mod: CS | Performed by: FAMILY MEDICINE

## 2022-11-23 RX ORDER — OSELTAMIVIR PHOSPHATE 75 MG/1
75 CAPSULE ORAL 2 TIMES DAILY
Qty: 10 CAPSULE | Refills: 0 | Status: SHIPPED | OUTPATIENT
Start: 2022-11-23 | End: 2022-11-28

## 2022-11-24 NOTE — PATIENT INSTRUCTIONS
Hold Simvastatin now - do not take tonight and hold for 10 days.     Continue the Bupropion - the effect of this may be lessened by the Covid medication but this is temporary and should not require a dose increase for this short amount of time.     May discontinue the Tamiflu.     Isolate for at least 5 days from onset of illness, longer if still having a lot of symptoms.      Tylenol, fluids, diet as tolerated, rest, isolate. Cough drops and cold medications as needed for symptoms. Honey in tea or warm water, ice cream or popsicles to soothe the throat. Warm packs, baths for body aches.  COVID-19 Outpatient Treatments  Your care team can help you find the best treatments for COVID-19. Talk to a health care provider or refer to the FDA medicine fact sheets below.  Important: You CAN'T have molnupiravir or Paxlovid if you are starting the medicine more than 5 days after your symptoms have started.  Paxlovid: https://www.fda.gov/media/140291/download  Molnupiravir: https://www.fda.gov/media/165377/download  Monoclonal antibodies: https://combatcovid.hhs.gov/what-are-monoclonal-antibodies  Paxlovid (nimatrelvir and ritonavir)  How it works  Two medicines (nirmatrelvir and ritonavir) are taken together. They stop the virus from growing. Less amount of virus is easier for your body to fight.  Benefits  Lowers risk of a hospital stay or death from COVID-19.  How to take  Medicine comes in a daily container with both medicine tablets. Take by mouth twice daily (once in the morning, once at night) for 5 days.  The number of tablets to take varies by patient.  Don't chew or break capsules. Swallow whole.  When to take  Take as soon as possible after positive COVID-19 test result, and within 5 days of your first symptoms.  Who can take it  Patients must be 12 years or older, weigh at least 88 pounds, and have tested positive for COVID-19. This is the preferred treatment for pregnant patients.  Possible side effects  Can cause  altered sense of taste, diarrhea (loose, watery stools), high blood pressure, muscle aches.  Medicine conflicts  Some medicines may conflict with Paxlovid and may cause serious side effects.  Tell your care team about all the medicines you take, including prescription and over-the-counter medicines, vitamins and herbal supplements.  Your provider will review your medicines to make sure that you can safely take Paxlovid.  Cautions  Paxlovid is not advised for patients with severe kidney or liver disease. If you have kidney or liver problems, the dose may need to be changed.  If you are pregnant or breastfeeding, talk to your care team about your options.  If you are sexually active, use trusted birth control while taking Paxlovid.  Molnupiravir  How it works  Stops the virus from growing. Less amount of virus is easier for your body to fight.  Benefits  Lowers risk of a hospital stay or death from COVID-19.  How to take  Take 4 capsules by mouth every 12 hours (4 in the morning and 4 at night) for 5 days. Don't chew or break capsules. Swallow whole.  When to take  Take as soon as possible after positive COVID-19 test result, and within 5 days of your first symptoms.  Who can take it  Patients must be 18 years or older and have tested positive for COVID-19.  Possible side effects  Diarrhea (loose, watery stools), nausea (feeling sick to your stomach), dizziness, headaches.  Medicine conflicts  Right now, there are no known conflicts with other drugs. But tell your care team all medicines you take.  Cautions  This is not advised for patients who are pregnant.  Patients who could become pregnant should use trusted birth control until 4 days after their last dose.  Sexually active people of any gender should use trusted birth control for 3 months after their last dose.  Monoclonal antibodies  How it works  Monoclonal antibodies can detect pieces of the COVID virus and stop it from infecting your cells.  Benefits  Lowers  risk of a hospital stay or death from COVID-19. Monoclonal antibodies are known to work well against the omicron variant.  How it is given to you  You will receive the treatment either by an infusion through your vein (IV) or shots.  When to take  Get as soon as possible after you test positive for COVID-19, and within 7 days of your first symptoms.  Who can take it  Patients must be 12 years or older, weigh at least 88 pounds and have tested positive for COVID-19.  Possible side effects  Fever, chills, diarrhea (loose, watery stools), dizziness, itchiness and rash.  More serious side effects include: fever, difficulty breathing, low oxygen level in your blood, chills, tiredness, fast or slow heart rate, chest discomfort or pain, weakness, confusion, nausea, headache, shortness of breath, low or high blood pressure, wheezing, swelling of your lips, face, or throat, rash including hives, itching, muscle aches, dizziness, feeling faint and sweating.  If you receive an IV, you may have brief pain, bleeding and bruising of the skin, soreness, swelling and possible infection at the place where you get the IV needle.  Medicine conflicts  Please tell you care team other medicines you take so they can assess if there are any conflicts.  Cautions  Your doctor will talk with you about risks and benefits of this treatment and will help choose the best option for you.  For informational purposes only. Not to replace the advice of your health care provider.  Copyright   2022 [facility]. All rights reserved. Clinically reviewed by Christal Mckenna. [place] [id number] - 08/22.

## 2022-11-24 NOTE — PROGRESS NOTES
Assessment/Plan:   Throat pain  Fever and chills  Myalgia  Influenza-like illness  Acute onset late yesterday of headache, ST, myalgia, chills, sweats, fatigue, congestion, cough and swollen glands.   RST, flu negative but symptoms very flu like (except no high fever) and there is an epidemic of influenza in the community at this time. Recommend treatment with tamiflu. Also, fluids, tylenol diet as tolerated, steam and nasal saline for congestion. Daughter with similar symptoms, she is immunosuppressed. Close follow up if worse or no better.   Tylenol, fluids, diet as tolerated, rest, isolate. Cough drops and cold medications as needed for symptoms. Honey in tea or warm water, ice cream or popsicles to soothe the throat. Warm packs, baths for body aches.   Recheck if worse or no better.   - Streptococcus A Rapid Screen w/Reflex to PCR - Clinic Collect  - Symptomatic; Yes; 11/22/2022 COVID-19 Virus (Coronavirus) by PCR Nose  - Group A Streptococcus PCR Throat Swab  - Influenza A & B Antigen - Clinic Collect  - oseltamivir (TAMIFLU) 75 MG capsule; Take 1 capsule (75 mg) by mouth 2 times daily for 5 days  Dispense: 10 capsule; Refill: 0    I discussed red flag symptoms, return precautions to clinic/ER and follow up care with patient/parent.  Expected clinical course, symptomatic cares advised. Questions answered. Patient/parent amenable with plan.      Addendum:  Infection due to 2019 novel coronavirus  - nirmatrelvir and ritonavir (PAXLOVID) therapy pack; Take 2 tablets by mouth 2 times daily for 5 days (Take 2 Nirmatrelvir tablets and 1 Ritonavir tablet twice daily for 5 days)  Dispense: 30 each; Refill: 0    Paxlovid twice daily for 5 days. Risks include age, high cholesterol, possible renal insufficiency, household contact who is immunosuppressed.    Renal dose was prescribed due to prompting from Epic - despite normal GFR (67) she has a slight decreased CrCl (<60) in Epic due to her very slightly high Cr for her  BMI. She has no known kidney disease but I did not feel the higher dose benefit outweighed any potential risk for her.      Reviewed drug interactions and side effects of the medication.     Hold Simvastatin now - do not take tonight and hold for 10 days.     Continue the Bupropion - the effect of this may be lessened by the Covid medication but this is temporary and should not require a dose increase for this short amount of time.     May discontinue the Tamiflu.     Isolate for at least 5 days from onset of illness, longer if still having a lot of symptoms.    Follow up as needed.     Subjective:     Lolis Gaines is a 52 year old female who presents with throat pain, myalgia, chills and headache. Onset late yesterday.   Some nasal stuffiness and congestion. Swollen glands. No fever but feels chills. No rash, N/V/D. Decreased appetite. No wheeze or shortness of breath. No significant cough.   Daughter is immunosuppressed and has similar symptoms.     Allergies   Allergen Reactions     Erythromycin Base [Erythromycin] Unknown     Morphine Nausea     IV morphine causes nausea, and anxiety; Tolerates codeine based derivatives.     Current Outpatient Medications   Medication     buPROPion (WELLBUTRIN XL) 150 MG 24 hr tablet     JUNEL 1/20, 21, 1-20 mg-mcg per tablet     levothyroxine (SYNTHROID/LEVOTHROID) 112 MCG tablet     nirmatrelvir and ritonavir (PAXLOVID) therapy pack     oseltamivir (TAMIFLU) 75 MG capsule     simvastatin (ZOCOR) 40 MG tablet     No current facility-administered medications for this visit.     Patient Active Problem List   Diagnosis     Hyperlipidemia     Fibromyalgia     Premenstrual Disorder     Macules And Papules     Hypothyroidism     Esophageal Reflux     Constipation     Allergic Rhinitis     Other fatigue     Rosacea     Family history of colon cancer     Vitamin D deficiency     Myalgia     Acute right-sided low back pain without sciatica       Objective:     /73   Pulse 105    Temp 98.4  F (36.9  C) (Oral)   Resp 14   Wt 54 kg (119 lb)   LMP  (LMP Unknown)   SpO2 98%   BMI 21.08 kg/m      Physical    General Appearance: Alert, pleasant, no distress but ill appearing and low energy. AVSS  Head: Normocephalic, without obvious abnormality, atraumatic  Eyes: Conjunctivae are normal.   Ears: Normal TMs and external ear canals, both ears  Nose: mild congestion.  Throat: Throat is red. Tonsils absent.  No exudate.  No vesicular lesions  Neck: tender adenopathy  Lungs: Clear to auscultation bilaterally, respirations unlabored  Heart: Regular rate and rhythm  Abdomen: Soft, non-tender  Extremities: No lower extremity edema  Skin: Skin color, texture, turgor normal, no rashes or lesions  Psychiatric: Patient has a normal mood and affect.     Results for orders placed or performed in visit on 11/23/22   Symptomatic; Yes; 11/22/2022 COVID-19 Virus (Coronavirus) by PCR Nose     Status: Abnormal    Specimen: Nose; Swab   Result Value Ref Range    SARS CoV2 PCR Positive (A) Negative    Narrative    Testing was performed using the Aptima SARS-CoV-2 Assay on the  [employer] Instrument System. Additional information about this  Emergency Use Authorization (EUA) assay can be found via the Lab  Guide. This test should be ordered for the detection of SARS-CoV-2 in  individuals who meet SARS-CoV-2 clinical and/or epidemiological  criteria. Test performance is unknown in asymptomatic patients. This  test is for in vitro diagnostic use under the FDA EUA for  laboratories certified under CLIA to perform high complexity testing.  This test has not been FDA cleared or approved. A negative result  does not rule out the presence of PCR inhibitors in the specimen or  target RNA in concentration below the limit of detection for the  assay. The possibility of a false negative should be considered if  the patient's recent exposure or clinical presentation suggests  COVID-19. This test was validated by the [facility]. This laboratory is certified under  the Clinical Laboratory Improvement Amendments of 1988 (CLIA-88) as  qualified to perform high complexity laboratory testing.   Streptococcus A Rapid Screen w/Reflex to PCR - Clinic Collect     Status: Normal    Specimen: Throat; Swab   Result Value Ref Range    Group A Strep antigen Negative Negative   Influenza A & B Antigen - Clinic Collect     Status: Normal    Specimen: Nose; Swab   Result Value Ref Range    Influenza A antigen Negative Negative    Influenza B antigen Negative Negative    Narrative    Test results must be correlated with clinical data. If necessary, results should be confirmed by a molecular assay or viral culture.   Group A Streptococcus PCR Throat Swab     Status: Normal    Specimen: Throat; Swab   Result Value Ref Range    Group A strep by PCR Not Detected Not Detected    Narrative    The Xpert Xpress Strep A test, performed on the [employer] Systems, is a rapid, qualitative in vitro diagnostic test for the detection of Streptococcus pyogenes (Group A ß-hemolytic Streptococcus, Strep A) in throat swab specimens from patients with signs and symptoms of pharyngitis. The Xpert Xpress Strep A test can be used as an aid in the diagnosis of Group A Streptococcal pharyngitis. The assay is not intended to monitor treatment for Group A Streptococcus infections. The Xpert Xpress Strep A test utilizes an automated real-time polymerase chain reaction (PCR) to detect Streptococcus pyogenes DNA.       This note has been dictated in part using voice recognition software.  Any grammatical or context distortions are unintentional and inherent to the software.  Please feel free to contact me directly for clarification if needed.

## 2022-11-25 ENCOUNTER — TELEPHONE (OUTPATIENT)
Dept: NURSING | Facility: CLINIC | Age: 52
End: 2022-11-25

## 2022-11-25 NOTE — TELEPHONE ENCOUNTER
The patient is returning a call as she had gotten a message regarding the paxlovid dosage and the patient was returning the call.     Currently the patient has been taking the medication as prescribed which is the 3 tablets BID.    Please advise.    Merary Jay RN  [place] Nurse Advisor  10:58 AM  11/25/2022          Addendum:  Infection due to 2019 novel coronavirus  - nirmatrelvir and ritonavir (PAXLOVID) therapy pack; Take 2 tablets by mouth 2 times daily for 5 days (Take 2 Nirmatrelvir tablets and 1 Ritonavir tablet twice daily for 5 days)  Dispense: 30 each; Refill: 0     Paxlovid twice daily for 5 days. Risks include age, high cholesterol, possible renal insufficiency, household contact who is immunosuppressed.     Renal dose was prescribed due to prompting from Epic - despite normal GFR (67) she has a slight decreased CrCl (<60) in Epic due to her very slightly high Cr for her BMI. She has no known kidney disease but I did not feel the higher dose benefit outweighed any potential risk for her.       Reviewed drug interactions and side effects of the medication.      Hold Simvastatin now - do not take tonight and hold for 10 days.      Continue the Bupropion - the effect of this may be lessened by the Covid medication but this is temporary and should not require a dose increase for this short amount of time.      May discontinue the Tamiflu.      Isolate for at least 5 days from onset of illness, longer if still having a lot of symptoms.    Follow up as needed.

## 2022-11-25 NOTE — TELEPHONE ENCOUNTER
PCP did not evaluate or prescribe this medication for patient.     Spoke with patient to clarify if her questions were answered. States she wants to speak directly with provider at [facility] walk in clinic regarding dosage of paxlovid.    Routing back to their care team.

## 2022-12-01 NOTE — TELEPHONE ENCOUNTER
Attempted to contact patient but no answer. Left a message that I would call back again to make sure her question had been answered.

## 2022-12-04 NOTE — TELEPHONE ENCOUNTER
Discussed the tamiflu dosing and rationale for the decreased dose. She is feeling much better and has no other concerns at this time.

## 2022-12-19 ENCOUNTER — HOSPITAL ENCOUNTER (OUTPATIENT)
Dept: MAMMOGRAPHY | Facility: CLINIC | Age: 52
Discharge: HOME | End: 2022-12-19
Attending: FAMILY MEDICINE | Admitting: FAMILY MEDICINE
Payer: COMMERCIAL

## 2022-12-19 DIAGNOSIS — E03.9 HYPOTHYROIDISM: ICD-10-CM

## 2022-12-19 DIAGNOSIS — Z12.31 VISIT FOR SCREENING MAMMOGRAM: ICD-10-CM

## 2022-12-19 DIAGNOSIS — R53.83 FATIGUE: ICD-10-CM

## 2022-12-19 DIAGNOSIS — E78.5 HYPERLIPIDEMIA: ICD-10-CM

## 2022-12-19 DIAGNOSIS — M79.7 FIBROMYALGIA: ICD-10-CM

## 2022-12-19 DIAGNOSIS — Z12.31 ENCOUNTER FOR SCREENING MAMMOGRAM FOR BREAST CANCER: ICD-10-CM

## 2022-12-19 PROCEDURE — 77067 SCR MAMMO BI INCL CAD: CPT

## 2022-12-19 RX ORDER — SIMVASTATIN 40 MG
TABLET ORAL
Qty: 90 TABLET | Refills: 2 | Status: SHIPPED | OUTPATIENT
Start: 2022-12-19 | End: 2023-10-23

## 2022-12-19 RX ORDER — BUPROPION HYDROCHLORIDE 150 MG/1
150 TABLET ORAL DAILY
Qty: 90 TABLET | Refills: 3 | Status: SHIPPED | OUTPATIENT
Start: 2022-12-19 | End: 2024-01-22

## 2022-12-19 RX ORDER — LEVOTHYROXINE SODIUM 100 UG/1
TABLET ORAL
Qty: 90 TABLET | Refills: 3 | OUTPATIENT
Start: 2022-12-19

## 2022-12-19 NOTE — TELEPHONE ENCOUNTER
"Routing refill request to provider for review/approval because:  Labs out of range:  LDL  Note that patient is taking bupropion differently.     Last Written Prescription Date:  3/24/2022  Last Fill Quantity: 180,  # refills: 2   Last office visit provider:  11/7/2022    Requested Prescriptions   Pending Prescriptions Disp Refills     buPROPion (WELLBUTRIN XL) 150 MG 24 hr tablet [Pharmacy Med Name: BUPROPION HCL  MG TABLET] 180 tablet 2     Sig: TAKE 2 TABLETS BY MOUTH DAILY.       SSRIs Protocol Passed - 12/19/2022 12:25 AM        Passed - Recent (12 mo) or future (30 days) visit within the authorizing provider's specialty     Patient has had an office visit with the authorizing provider or a provider within the authorizing providers department within the previous 12 mos or has a future within next 30 days. See \"Patient Info\" tab in inbasket, or \"Choose Columns\" in Meds & Orders section of the refill encounter.              Passed - Medication is Bupropion     If the medication is Bupropion (Wellbutrin), and the patient is taking for smoking cessation; OK to refill.          Passed - Medication is active on med list        Passed - Patient is age 18 or older        Passed - No active pregnancy on record        Passed - No positive pregnancy test in last 12 months        Last Written Prescription Date:  3/27/2022  Last Fill Quantity: 90,  # refills: 2   Last office visit provider:  11/7/2022       simvastatin (ZOCOR) 40 MG tablet [Pharmacy Med Name: SIMVASTATIN 40 MG TABLET] 90 tablet 2     Sig: TAKE 1 TABLET BY MOUTH EVERYDAY AT BEDTIME       Statins Protocol Passed - 12/19/2022 12:25 AM        Passed - LDL on file in past 12 months     Recent Labs   Lab Test 11/07/22  1004   *             Passed - No abnormal creatine kinase in past 12 months     No lab results found.             Passed - Recent (12 mo) or future (30 days) visit within the authorizing provider's specialty     Patient has had an " "office visit with the authorizing provider or a provider within the authorizing providers department within the previous 12 mos or has a future within next 30 days. See \"Patient Info\" tab in inbasket, or \"Choose Columns\" in Meds & Orders section of the refill encounter.              Passed - Medication is active on med list        Passed - Patient is age 18 or older        Passed - No active pregnancy on record        Passed - No positive pregnancy test in past 12 months         Refused Prescriptions Disp Refills     levothyroxine (SYNTHROID/LEVOTHROID) 100 MCG tablet [Pharmacy Med Name: LEVOTHYROXINE 100 MCG TABLET] 90 tablet 3     Sig: TAKE 1 TABLET BY MOUTH EVERY DAY       Thyroid Protocol Passed - 12/19/2022 12:25 AM        Passed - Patient is 12 years or older        Passed - Recent (12 mo) or future (30 days) visit within the authorizing provider's specialty     Patient has had an office visit with the authorizing provider or a provider within the authorizing providers department within the previous 12 mos or has a future within next 30 days. See \"Patient Info\" tab in inSocialMeterTVsket, or \"Choose Columns\" in Meds & Orders section of the refill encounter.              Passed - Medication is active on med list        Passed - Normal TSH on file in past 12 months     Recent Labs   Lab Test 11/07/22  1004   TSH 3.82              Passed - No active pregnancy on record     If patient is pregnant or has had a positive pregnancy test, please check TSH.          Passed - No positive pregnancy test in past 12 months     If patient is pregnant or has had a positive pregnancy test, please check TSH.               Eloina Ross RN 12/19/22 1:09 PM  "

## 2023-01-01 ENCOUNTER — TRANSFERRED RECORDS (OUTPATIENT)
Dept: MULTI SPECIALTY CLINIC | Facility: CLINIC | Age: 53
End: 2023-01-01

## 2023-01-01 LAB — PAP SMEAR - HIM PATIENT REPORTED: NORMAL

## 2023-03-30 DIAGNOSIS — E03.9 HYPOTHYROIDISM: ICD-10-CM

## 2023-03-31 NOTE — TELEPHONE ENCOUNTER
Please call and check with patient. She should be on 112 mcg tab and not 100 mcg. If confirmed that she is taking 112, then she is also due for a lab check.     If she is still taking 100, let me know as she should be on 112 mcg.

## 2023-03-31 NOTE — TELEPHONE ENCOUNTER
"Routing refill request to provider for review/approval because:  Dose on incoming Rx appears outdated.  Labs not current:  TSH  Dose changed 11/11/2022 to 112 mcg with labs ordered for recheck, however no current labs done.    Last Written Prescription (for 112 mcg) Date:  11/11/2022  Last Fill Quantity: 90,  # refills: 3   Last office visit provider:  11/7/2022     Requested Prescriptions   Pending Prescriptions Disp Refills     levothyroxine (SYNTHROID/LEVOTHROID) 100 MCG tablet [Pharmacy Med Name: LEVOTHYROXINE 100 MCG TABLET] 90 tablet 3     Sig: TAKE 1 TABLET BY MOUTH EVERY DAY       Thyroid Protocol Passed - 3/31/2023 11:29 AM        Passed - Patient is 12 years or older        Passed - Recent (12 mo) or future (30 days) visit within the authorizing provider's specialty     Patient has had an office visit with the authorizing provider or a provider within the authorizing providers department within the previous 12 mos or has a future within next 30 days. See \"Patient Info\" tab in inbasket, or \"Choose Columns\" in Meds & Orders section of the refill encounter.              Passed - Medication is active on med list        Passed - Normal TSH on file in past 12 months     Recent Labs   Lab Test 11/07/22  1004   TSH 3.82              Passed - No active pregnancy on record     If patient is pregnant or has had a positive pregnancy test, please check TSH.          Passed - No positive pregnancy test in past 12 months     If patient is pregnant or has had a positive pregnancy test, please check TSH.               Puja Baltazar RN 03/31/23 11:31 AM  "

## 2023-04-03 NOTE — TELEPHONE ENCOUNTER
Left message to call back for: Lolis  Information to relay to patient: Please relay message and ask questions below from Dr Barrios.

## 2023-04-05 ENCOUNTER — MYC MEDICAL ADVICE (OUTPATIENT)
Dept: FAMILY MEDICINE | Facility: CLINIC | Age: 53
End: 2023-04-05
Payer: COMMERCIAL

## 2023-04-05 RX ORDER — LEVOTHYROXINE SODIUM 100 UG/1
TABLET ORAL
Qty: 90 TABLET | Refills: 3 | Status: SHIPPED | OUTPATIENT
Start: 2023-04-05 | End: 2023-07-27

## 2023-04-05 NOTE — TELEPHONE ENCOUNTER
Patient called back, confirmed that she is currently taking the 112 mcg NOT the 100 mcg.  Will schedule lab appointment with location near home.

## 2023-07-26 ENCOUNTER — NURSE TRIAGE (OUTPATIENT)
Dept: NURSING | Facility: CLINIC | Age: 53
End: 2023-07-26
Payer: COMMERCIAL

## 2023-07-27 ENCOUNTER — OFFICE VISIT (OUTPATIENT)
Dept: INTERNAL MEDICINE | Facility: CLINIC | Age: 53
End: 2023-07-27
Payer: COMMERCIAL

## 2023-07-27 VITALS
OXYGEN SATURATION: 98 % | SYSTOLIC BLOOD PRESSURE: 98 MMHG | HEART RATE: 84 BPM | RESPIRATION RATE: 16 BRPM | BODY MASS INDEX: 22.15 KG/M2 | DIASTOLIC BLOOD PRESSURE: 58 MMHG | WEIGHT: 125 LBS | HEIGHT: 63 IN | TEMPERATURE: 98.3 F
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
## 2023-07-27 DIAGNOSIS — S50.811D: Primary | ICD-10-CM

## 2023-07-27 PROBLEM — S50.811A CAT SCRATCH OF RIGHT FOREARM: Status: ACTIVE | Noted: 2023-07-27
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
## 2023-07-27 PROCEDURE — 90471 IMMUNIZATION ADMIN: CPT | Performed by: INTERNAL MEDICINE

## 2023-07-27 PROCEDURE — 90714 TD VACC NO PRESV 7 YRS+ IM: CPT | Performed by: INTERNAL MEDICINE

## 2023-07-27 PROCEDURE — 99213 OFFICE O/P EST LOW 20 MIN: CPT | Mod: 25 | Performed by: INTERNAL MEDICINE

## 2023-07-27 RX ORDER — ESTRADIOL/NORETHINDRONE ACETATE TRANSDERMAL SYSTEM .05; .25 MG/D; MG/D
PATCH, EXTENDED RELEASE TRANSDERMAL
COMMUNITY

## 2023-07-27 NOTE — TELEPHONE ENCOUNTER
Nurse Triage SBAR    Is this a 2nd Level Triage? NO    Situation: Cat scratch follow up.     Background: Pt scratched by her kitten about 1 week ago, has three small puncture wounds on her hand. She completed a virtual visit with [facility] and got a prescription for Augmentin, which she finished yesterday. She was advised to get a tetanus vaccine ASAP but has not received this yet.     Assessment: Her wounds seemed to be getting a lot better while on the antibiotic. Today, she noticed a small increase in pain.     She denies spreading redness, drainage.     Protocol Recommended Disposition:   See a provider within 24 hours, in-person. Patient verbalized understanding and had no further questions. Call transferred to scheduling.     Jackelyn Correa RN  [place] Nurse Advisor  7/26/2023 10:52 PM     Reason for Disposition   [1] Animal bite infection AND [2] taking an antibiotic   [1] Taking antibiotic > 24 hours for infected bite AND [2] bite getting worse (more swollen, more redness and increased pain)    Additional Information   Negative: [1] Major bleeding (e.g., actively dripping or spurting) AND [2] can't be stopped   Negative: Sounds like a life-threatening emergency to the triager   Negative: [1] Any break in skin from BITE (e.g., cut, puncture or scratch) AND[2] WILD animal at risk for RABIES (e.g., bat, raccoon, badillo, skunk, coyote, other carnivores)   Negative: [1] Any break in skin from BITE (e.g., cut, puncture or scratch) AND[2] PET animal (e.g., dog, cat, or ferret) at risk for RABIES (e.g., sick, stray, unprovoked bite, developing country)   Negative: [1] Any break in skin from BITE (e.g., cut, puncture or scratch) AND[2] monkey   Negative: [1] EXPOSURE of non-intact skin (e.g., exposed person has dermatitis, abrasion, wound) AND[2] with animal BODY FLUID (e.g., saliva such as licking, blood, brain) AND[3] animal at high-risk for RABIES (e.g., bat, raccoon, badillo, skunk, coyote, other carnivores)    Negative: [1] Cut (length > 1/8 inch or 3 mm) or skin tear AND [2] any animal  (Exception: superficial scratch that doesn't go through dermis)   Negative: [1] Bleeding AND [2] won't stop after 10 minutes of direct pressure (using correct technique)   Negative: Description of bite sounds severe to the triager   Negative: [1] Puncture wound (hole through the skin) AND [2] from a cat bite (or deep claw puncture wound)   Negative: [1] Puncture wound or small cut AND [2] on face (Exception: puncture  from small pet such as gerbil, mouse, hamster, puppy)   Negative: [1] Puncture wound or small cut AND [2] on hands or genitals (Exception: puncture  from small pet such as gerbil, mouse, hamster, puppy or turtle)     Kitten   Negative: [1] Puncture wound or small cut AND [2] weak immune system (e.g., HIV positive, cancer chemo, splenectomy, organ transplant, chronic steroids)     Has a weak immune system but pt was already seen following the cat scratch   Negative: Looks infected (spreading redness, red streak, pus)   Negative: [1] No bite maggie or scratch AND [2] suspected bat exposure (e.g., bat found in same room as sleeping adult)    Protocols used: Infection on Antibiotic Follow-up Call-A-AH, Animal Bite-A-AH

## 2023-07-27 NOTE — PROGRESS NOTES
1. Cat scratch of right forearm, subsequent encounter  53-year-old woman scratched by her cat last week who developed increasing redness and pain.  She was started on Augmentin 875 mg twice daily and has completed a 7-day course.  Her arm looks significantly better although she noticed some increasing mild pain yesterday that prompted her to schedule the appointment today.  No increasing redness or warmth.  The wound has essentially healed and her arm now looks good.  There is no lymphadenopathy.  She is actually having less discomfort today than yesterday.  We discussed that sometimes a 10-day course of the antibiotic is preferred.  I will get her another 6 tablets in case she needs to resume the antibiotic but I would suggest avoiding doing this unless she starts to notice increasing pain again.  We will make sure that her tetanus is updated today with a Td vaccine.  - amoxicillin-clavulanate (AUGMENTIN) 875-125 MG tablet; Take 1 tablet by mouth 2 times daily for 3 days  Dispense: 6 tablet; Refill: 0        Subjective   Lolis is a 53 year old, presenting for the following health issues:  Abrasion (Cat scratch on right hand last Tuesday. Was given an antibiotic from  [facility]. Still hurts and is red. Was advised to get Td)        7/27/2023     4:34 PM   Additional Questions   Roomed by        History of Present Illness       Reason for visit:  Cat scratch follow up from another provider    She eats 2-3 servings of fruits and vegetables daily.She consumes 0 sweetened beverage(s) daily.She exercises with enough effort to increase her heart rate 10 to 19 minutes per day.  She exercises with enough effort to increase her heart rate 3 or less days per week.   She is taking medications regularly.         Review of Systems   No fevers or chills      Objective    BP 98/58 (BP Location: Right arm, Patient Position: Sitting, Cuff Size: Adult Regular)   Pulse 84   Temp 98.3  F (36.8  C) (Oral)   Resp 16   Ht 1.6 m  "(5' 3\")   Wt 56.7 kg (125 lb)   SpO2 98%   BMI 22.14 kg/m    Body mass index is 22.14 kg/m .  Physical Exam   Healed scratch/wound on right forearm with no surrounding erythema or warmth.  No significant tenderness.  No lymphadenopathy.          "

## 2023-10-08 ENCOUNTER — HOSPITAL ENCOUNTER (EMERGENCY)
Facility: CLINIC | Age: 53
Discharge: HOME | End: 2023-10-08
Attending: EMERGENCY MEDICINE | Admitting: EMERGENCY MEDICINE
Payer: COMMERCIAL

## 2023-10-08 ENCOUNTER — APPOINTMENT (OUTPATIENT)
Dept: RADIOLOGY | Facility: CLINIC | Age: 53
End: 2023-10-08
Attending: EMERGENCY MEDICINE
Payer: COMMERCIAL

## 2023-10-08 VITALS
OXYGEN SATURATION: 99 % | BODY MASS INDEX: 22.15 KG/M2 | HEIGHT: 63 IN | HEART RATE: 75 BPM | DIASTOLIC BLOOD PRESSURE: 53 MMHG | SYSTOLIC BLOOD PRESSURE: 98 MMHG | RESPIRATION RATE: 26 BRPM | WEIGHT: 125 LBS | TEMPERATURE: 97.6 F

## 2023-10-08 DIAGNOSIS — R07.9 CHEST PAIN, UNSPECIFIED TYPE: ICD-10-CM

## 2023-10-08 LAB
ALBUMIN SERPL BCG-MCNC: 4 G/DL (ref 3.5–5.2)
ALP SERPL-CCNC: 100 U/L (ref 35–104)
ALT SERPL W P-5'-P-CCNC: 22 U/L (ref 0–50)
ANION GAP SERPL CALCULATED.3IONS-SCNC: 12 MMOL/L (ref 7–15)
AST SERPL W P-5'-P-CCNC: 24 U/L (ref 0–45)
ATRIAL RATE - MUSE: 69 BPM
BASO+EOS+MONOS # BLD AUTO: ABNORMAL 10*3/UL
BASO+EOS+MONOS NFR BLD AUTO: ABNORMAL %
BASOPHILS # BLD AUTO: 0 10E3/UL (ref 0–0.2)
BASOPHILS NFR BLD AUTO: 1 %
BILIRUB SERPL-MCNC: 0.3 MG/DL
BUN SERPL-MCNC: 14.4 MG/DL (ref 6–20)
CALCIUM SERPL-MCNC: 8.8 MG/DL (ref 8.6–10)
CHLORIDE SERPL-SCNC: 103 MMOL/L (ref 98–107)
CREAT SERPL-MCNC: 0.95 MG/DL (ref 0.51–0.95)
D DIMER PPP FEU-MCNC: 0.28 UG/ML FEU (ref 0–0.5)
DEPRECATED HCO3 PLAS-SCNC: 26 MMOL/L (ref 22–29)
DIASTOLIC BLOOD PRESSURE - MUSE: 50 MMHG
EGFRCR SERPLBLD CKD-EPI 2021: 72 ML/MIN/1.73M2
EOSINOPHIL # BLD AUTO: 0.4 10E3/UL (ref 0–0.7)
EOSINOPHIL NFR BLD AUTO: 5 %
ERYTHROCYTE [DISTWIDTH] IN BLOOD BY AUTOMATED COUNT: 14.5 % (ref 10–15)
GLUCOSE SERPL-MCNC: 97 MG/DL (ref 70–99)
HCT VFR BLD AUTO: 34.8 % (ref 35–47)
HGB BLD-MCNC: 11.4 G/DL (ref 11.7–15.7)
IMM GRANULOCYTES # BLD: 0 10E3/UL
IMM GRANULOCYTES NFR BLD: 1 %
INTERPRETATION ECG - MUSE: NORMAL
LYMPHOCYTES # BLD AUTO: 3.1 10E3/UL (ref 0.8–5.3)
LYMPHOCYTES NFR BLD AUTO: 41 %
MCH RBC QN AUTO: 29.5 PG (ref 26.5–33)
MCHC RBC AUTO-ENTMCNC: 32.8 G/DL (ref 31.5–36.5)
MCV RBC AUTO: 90 FL (ref 78–100)
MONOCYTES # BLD AUTO: 0.7 10E3/UL (ref 0–1.3)
MONOCYTES NFR BLD AUTO: 9 %
NEUTROPHILS # BLD AUTO: 3.4 10E3/UL (ref 1.6–8.3)
NEUTROPHILS NFR BLD AUTO: 43 %
NRBC # BLD AUTO: 0 10E3/UL
NRBC BLD AUTO-RTO: 0 /100
P AXIS - MUSE: 55 DEGREES
PLATELET # BLD AUTO: 229 10E3/UL (ref 150–450)
POTASSIUM SERPL-SCNC: 3.5 MMOL/L (ref 3.4–5.3)
PR INTERVAL - MUSE: 142 MS
PROT SERPL-MCNC: 6.8 G/DL (ref 6.4–8.3)
QRS DURATION - MUSE: 78 MS
QT - MUSE: 402 MS
QTC - MUSE: 430 MS
R AXIS - MUSE: 91 DEGREES
RBC # BLD AUTO: 3.86 10E6/UL (ref 3.8–5.2)
SODIUM SERPL-SCNC: 141 MMOL/L (ref 135–145)
SYSTOLIC BLOOD PRESSURE - MUSE: 99 MMHG
T AXIS - MUSE: 57 DEGREES
TROPONIN T SERPL HS-MCNC: 6 NG/L
TROPONIN T SERPL HS-MCNC: 7 NG/L
VENTRICULAR RATE- MUSE: 69 BPM
WBC # BLD AUTO: 7.7 10E3/UL (ref 4–11)

## 2023-10-08 PROCEDURE — 71046 X-RAY EXAM CHEST 2 VIEWS: CPT

## 2023-10-08 PROCEDURE — 93005 ELECTROCARDIOGRAM TRACING: CPT | Performed by: EMERGENCY MEDICINE

## 2023-10-08 PROCEDURE — 84484 ASSAY OF TROPONIN QUANT: CPT | Performed by: EMERGENCY MEDICINE

## 2023-10-08 PROCEDURE — 85025 COMPLETE CBC W/AUTO DIFF WBC: CPT | Performed by: EMERGENCY MEDICINE

## 2023-10-08 PROCEDURE — 80053 COMPREHEN METABOLIC PANEL: CPT | Performed by: EMERGENCY MEDICINE

## 2023-10-08 PROCEDURE — 85379 FIBRIN DEGRADATION QUANT: CPT | Performed by: EMERGENCY MEDICINE

## 2023-10-08 PROCEDURE — 99285 EMERGENCY DEPT VISIT HI MDM: CPT | Mod: 25

## 2023-10-08 PROCEDURE — 36415 COLL VENOUS BLD VENIPUNCTURE: CPT | Performed by: EMERGENCY MEDICINE

## 2023-10-08 RX ORDER — FAMOTIDINE 40 MG/1
40 TABLET, FILM COATED ORAL DAILY
Qty: 30 TABLET | Refills: 0 | Status: SHIPPED | OUTPATIENT
Start: 2023-10-08 | End: 2024-04-01

## 2023-10-08 ASSESSMENT — VISUAL ACUITY
OS: 20/20
OD: 20/20

## 2023-10-08 ASSESSMENT — ACTIVITIES OF DAILY LIVING (ADL): ADLS_ACUITY_SCORE: 35

## 2023-10-08 NOTE — ED PROVIDER NOTES
EMERGENCY DEPARTMENT ENCOUNTER      NAME: Lolis Gaines  AGE: 53 year old female  YOB: 1970  MRN: [id number]  EVALUATION DATE & TIME: 10/8/2023  3:51 AM    PCP: Clemente Barrios    ED PROVIDER: Solitario Jacinto M.D.      Chief Complaint   Patient presents with    Chest Pain         FINAL IMPRESSION:  1. Chest pain, unspecified type          ED COURSE & MEDICAL DECISION MAKING:    Pertinent Labs & Imaging studies reviewed. (See chart for details)  53 year old female presents to the Emergency Department for evaluation of chest pain.  Patient was woken from sleep with chest pain.  Somewhat concerning.  No other risk factors.  Low risk by heart score.  Initial EKG does not show ischemia.  Troponins x2 are negative.  Did consider PE.  Low risk by Wells criteria.  D-dimer is negative.  Chest x-ray does not show pneumonia, pneumothorax or other cause of chest pain.  Labs otherwise unremarkable.  I suspect this is likely reflux.  We will give her Pepcid.  She will follow-up with her primary.  Also referred her to the rapid access heart clinic.  She will return for worsening symptoms.     4:13 AM I met with the patient to gather history and to perform my initial exam. I discussed the plan for care while in the Emergency Department.   5:40 AM I rechecked and updated patient on results.     At the conclusion of the encounter I discussed the results of all of the tests and the disposition. The questions were answered. The patient or family acknowledged understanding and was agreeable with the care plan.     Medical Decision Making    History:  Supplemental history from: Documented in chart, if applicable  External Record(s) reviewed: Documented in chart, if applicable.    Work Up:  Chart documentation includes differential considered and any EKGs or imaging independently interpreted by provider, where specified.  In additional to work up documented, I considered the following work up: Documented in chart, if  applicable.    External consultation:  Discussion of management with another provider: Documented in chart, if applicable    Complicating factors:  Care impacted by chronic illness: Hyperlipidemia  Care affected by social determinants of health: N/A    Disposition considerations: Discharge. I prescribed additional prescription strength medication(s) as charted. N/A.         MEDICATIONS GIVEN IN THE EMERGENCY:  Medications - No data to display    NEW PRESCRIPTIONS STARTED AT TODAY'S ER VISIT  New Prescriptions    FAMOTIDINE (PEPCID) 40 MG TABLET    Take 1 tablet (40 mg) by mouth daily          =================================================================    HPI    Patient information was obtained from: Patient    Use of : N/A         Lolis Gaines is a 53 year old female with a pertinent history of hyperlipidemia, esophageal reflux, hypothyroidism, who presents to this ED via EMS for evaluation of chest pain.    Patient reports she woke up with mid chest pain this morning around 0300. She states that pain radiated to the right side of her chest. She notes associating shortness of breath. Patient states that she took TUMS and four baby aspirin when calling 911. En route to ED, patient's pain resolved. She was feeling ok prior to bed. Otherwise, she denies any nausea, vomiting, cold sweats, leg pain, leg swelling, and recent travel. Patient denies any cardiac history. No other complaints at this time.    PAST MEDICAL HISTORY:  Past Medical History:   Diagnosis Date    Abnormal weight loss     Allergic rhinitis     Esophageal reflux     Family history of colon cancer     Fibrocystic breast     Fibromyalgia     Hyperlipemia     Hypothyroidism     Neoplasm     Ovarian cyst        PAST SURGICAL HISTORY:  Past Surgical History:   Procedure Laterality Date    LAPAROSCOPIC ENDOMETRIOSIS FULGURATION  1996    TONSILLECTOMY      WISDOM TOOTH EXTRACTION             CURRENT MEDICATIONS:    No current  "facility-administered medications for this encounter.     Current Outpatient Medications   Medication    famotidine (PEPCID) 40 MG tablet    buPROPion (WELLBUTRIN XL) 150 MG 24 hr tablet    COMBIPATCH 0.05-0.25 MG/DAY bi-weekly patch    levothyroxine (SYNTHROID/LEVOTHROID) 112 MCG tablet    simvastatin (ZOCOR) 40 MG tablet         ALLERGIES:  Allergies   Allergen Reactions    Erythromycin Base [Erythromycin] Unknown    Morphine Nausea     IV morphine causes nausea, and anxiety; Tolerates codeine based derivatives.       FAMILY HISTORY:  Family History   Problem Relation Age of Onset    Cancer Maternal Grandmother         Uterine    Colon Polyps Mother     Cerebrovascular Disease Mother     Dementia Mother     Alzheimer Disease Mother     Hypertension Mother     Other - See Comments Father         Blood Disorder    Cerebrovascular Disease Father     Heart Disease Father     Thyroid Disease Brother     No Known Problems Sister     No Known Problems Brother     Alcoholism Sister     Hereditary Breast and Ovarian Cancer Syndrome No family hx of     Breast Cancer No family hx of     Colon Cancer No family hx of     Endometrial Cancer No family hx of     Ovarian Cancer No family hx of        SOCIAL HISTORY:   Social History     Socioeconomic History    Marital status:      Spouse name: Hugo    Number of children: 1    Years of education: 16    Highest education level: Bachelor's degree (e.g., BA, AB, BS)   Occupational History    Occupation: Lead Specialist   Tobacco Use    Smoking status: Never    Smokeless tobacco: Never   Vaping Use    Vaping Use: Never used   Substance and Sexual Activity    Alcohol use: Not Currently    Drug use: Never    Sexual activity: Yes     Partners: Male     Birth control/protection: Pill   Social History Narrative    She currently lives with family and does not work.       VITALS:  BP 98/53   Pulse 75   Temp 97.6  F (36.4  C)   Resp 26   Ht 1.6 m (5' 3\")   Wt 56.7 kg (125 lb)   " SpO2 99%   BMI 22.14 kg/m      PHYSICAL EXAM    Physical Exam      LAB:  All pertinent labs reviewed and interpreted.  Labs Ordered and Resulted from Time of ED Arrival to Time of ED Departure   CBC WITH PLATELETS AND DIFFERENTIAL - Abnormal       Result Value    WBC Count 7.7      RBC Count 3.86      Hemoglobin 11.4 (*)     Hematocrit 34.8 (*)     MCV 90      MCH 29.5      MCHC 32.8      RDW 14.5      Platelet Count 229      % Neutrophils 43      % Lymphocytes 41      % Monocytes 9      Mids % (Monos, Eos, Basos)        % Eosinophils 5      % Basophils 1      % Immature Granulocytes 1      NRBCs per 100 WBC 0      Absolute Neutrophils 3.4      Absolute Lymphocytes 3.1      Absolute Monocytes 0.7      Mids Abs (Monos, Eos, Basos)        Absolute Eosinophils 0.4      Absolute Basophils 0.0      Absolute Immature Granulocytes 0.0      Absolute NRBCs 0.0     COMPREHENSIVE METABOLIC PANEL - Normal    Sodium 141      Potassium 3.5      Carbon Dioxide (CO2) 26      Anion Gap 12      Urea Nitrogen 14.4      Creatinine 0.95      GFR Estimate 72      Calcium 8.8      Chloride 103      Glucose 97      Alkaline Phosphatase 100      AST 24      ALT 22      Protein Total 6.8      Albumin 4.0      Bilirubin Total 0.3     TROPONIN T, HIGH SENSITIVITY - Normal    Troponin T, High Sensitivity 7     D DIMER QUANTITATIVE - Normal    D-Dimer Quantitative 0.28     TROPONIN T, HIGH SENSITIVITY - Normal    Troponin T, High Sensitivity 6         RADIOLOGY:  Reviewed all pertinent imaging. Please see official radiology report.  Chest XR,  PA & LAT   Final Result   IMPRESSION: Negative chest.          EKG:    Performed at: 358  Impression: Sinus rhythm with right axis.  No acute ischemic changes.  When compared to previous dated December 8, 2017 no specific changes  Sinus rhythm with a rate of 69.  .  QRS 78.  QTc 430.    I have independently reviewed and interpreted the EKG(s) documented above.          Dafne LACEY, am serving as a  scribe to document services personally performed by Dr. Solitario Jacinto, based on my observation and the provider's statements to me. I, Solitario Jacinto MD attest that Dafne Lu is acting in a scribe capacity, has observed my performance of the services and has documented them in accordance with my direction.    Solitario Jacinto M.D.  Emergency Medicine  [facility] EMERGENCY ROOM  [address] [facility] [address]  [phone]  Dept: [phone]       Solitario Jacinto MD  10/08/23 0635

## 2023-10-08 NOTE — ED TRIAGE NOTES
Pt presents to ED after having sudden onset of left sided chest pain. Pain radiated to the left arm and pt also stated she had SOB during her chest pain episode. Full does of Asprin and three Tums taken prior to arrival. Pain free on arrival, VSS.      Triage Assessment       Row Name 10/08/23 0409       Triage Assessment (Adult)    Airway WDL WDL       Respiratory WDL    Respiratory WDL WDL       Skin Circulation/Temperature WDL    Skin Circulation/Temperature WDL WDL       Cardiac WDL    Cardiac WDL chest pain       Chest Pain Assessment    Chest Pain Location arm, left;anterior chest, left    Chest Pain Radiation arm    Character tightness    Associated Signs/Symptoms dyspnea    Chest Pain Intervention 12-lead ECG obtained;cardiac monitor placed;cardiac biomarkers drawn;cardiac monitoring continued       Peripheral/Neurovascular WDL    Peripheral Neurovascular WDL WDL       Cognitive/Neuro/Behavioral WDL    Cognitive/Neuro/Behavioral WDL WDL

## 2023-10-08 NOTE — ED NOTES
Vero Gaines [phone]  daughter calling to get information on mother.  Diane Sepulveda RN 10/8/2023 4:03 AM

## 2023-10-21 DIAGNOSIS — E78.5 HYPERLIPIDEMIA: ICD-10-CM

## 2023-10-23 RX ORDER — SIMVASTATIN 40 MG
TABLET ORAL
Qty: 90 TABLET | Refills: 0 | Status: SHIPPED | OUTPATIENT
Start: 2023-10-23 | End: 2024-01-22

## 2024-01-20 DIAGNOSIS — E78.5 HYPERLIPIDEMIA: ICD-10-CM

## 2024-01-20 DIAGNOSIS — R53.83 FATIGUE: ICD-10-CM

## 2024-01-20 DIAGNOSIS — M79.7 FIBROMYALGIA: ICD-10-CM

## 2024-01-22 DIAGNOSIS — E03.9 HYPOTHYROIDISM, UNSPECIFIED TYPE: ICD-10-CM

## 2024-01-22 RX ORDER — BUPROPION HYDROCHLORIDE 150 MG/1
150 TABLET ORAL DAILY
Qty: 90 TABLET | Refills: 0 | Status: SHIPPED | OUTPATIENT
Start: 2024-01-22 | End: 2024-04-01

## 2024-01-22 RX ORDER — LEVOTHYROXINE SODIUM 112 UG/1
112 TABLET ORAL DAILY
Qty: 90 TABLET | Refills: 3 | Status: SHIPPED | OUTPATIENT
Start: 2024-01-22

## 2024-01-22 RX ORDER — SIMVASTATIN 40 MG
TABLET ORAL
Qty: 90 TABLET | Refills: 0 | Status: SHIPPED | OUTPATIENT
Start: 2024-01-22 | End: 2024-04-29

## 2024-01-23 NOTE — TELEPHONE ENCOUNTER
I have not seen patient since November 2022.  90-day refill sent to pharmacy on file.  Please schedule either annual physical or disease specific follow-up in office prior to next refill.

## 2024-01-23 NOTE — TELEPHONE ENCOUNTER
Left message to call back for: Patient  Information to relay to patient: See provider message and help patient schedule.

## 2024-03-20 ENCOUNTER — PATIENT OUTREACH (OUTPATIENT)
Dept: CARE COORDINATION | Facility: CLINIC | Age: 54
End: 2024-03-20
Payer: COMMERCIAL

## 2024-04-01 ENCOUNTER — OFFICE VISIT (OUTPATIENT)
Dept: FAMILY MEDICINE | Facility: CLINIC | Age: 54
End: 2024-04-01
Payer: COMMERCIAL

## 2024-04-01 VITALS
WEIGHT: 126.8 LBS | SYSTOLIC BLOOD PRESSURE: 97 MMHG | DIASTOLIC BLOOD PRESSURE: 62 MMHG | HEART RATE: 78 BPM | OXYGEN SATURATION: 98 % | RESPIRATION RATE: 12 BRPM | HEIGHT: 63 IN | TEMPERATURE: 97.6 F | BODY MASS INDEX: 22.47 KG/M2

## 2024-04-01 DIAGNOSIS — M79.7 FIBROMYALGIA: ICD-10-CM

## 2024-04-01 DIAGNOSIS — E03.9 HYPOTHYROIDISM, UNSPECIFIED TYPE: ICD-10-CM

## 2024-04-01 DIAGNOSIS — L71.9 ROSACEA: ICD-10-CM

## 2024-04-01 DIAGNOSIS — Z13.1 DIABETES MELLITUS SCREENING: ICD-10-CM

## 2024-04-01 DIAGNOSIS — E78.5 HYPERLIPIDEMIA, UNSPECIFIED HYPERLIPIDEMIA TYPE: Primary | ICD-10-CM

## 2024-04-01 PROBLEM — R76.8 POSITIVE ANA (ANTINUCLEAR ANTIBODY): Status: ACTIVE | Noted: 2023-01-31

## 2024-04-01 LAB
ERYTHROCYTE [DISTWIDTH] IN BLOOD BY AUTOMATED COUNT: 13.7 % (ref 10–15)
HCT VFR BLD AUTO: 41.6 % (ref 35–47)
HGB BLD-MCNC: 13.8 G/DL (ref 11.7–15.7)
MCH RBC QN AUTO: 30.3 PG (ref 26.5–33)
MCHC RBC AUTO-ENTMCNC: 33.2 G/DL (ref 31.5–36.5)
MCV RBC AUTO: 91 FL (ref 78–100)
PLATELET # BLD AUTO: 261 10E3/UL (ref 150–450)
RBC # BLD AUTO: 4.56 10E6/UL (ref 3.8–5.2)
WBC # BLD AUTO: 6.5 10E3/UL (ref 4–11)

## 2024-04-01 PROCEDURE — 99214 OFFICE O/P EST MOD 30 MIN: CPT | Performed by: FAMILY MEDICINE

## 2024-04-01 PROCEDURE — 80048 BASIC METABOLIC PNL TOTAL CA: CPT | Performed by: FAMILY MEDICINE

## 2024-04-01 PROCEDURE — 85027 COMPLETE CBC AUTOMATED: CPT | Performed by: FAMILY MEDICINE

## 2024-04-01 PROCEDURE — 84443 ASSAY THYROID STIM HORMONE: CPT | Performed by: FAMILY MEDICINE

## 2024-04-01 PROCEDURE — 36415 COLL VENOUS BLD VENIPUNCTURE: CPT | Performed by: FAMILY MEDICINE

## 2024-04-01 PROCEDURE — 80061 LIPID PANEL: CPT | Performed by: FAMILY MEDICINE

## 2024-04-01 PROCEDURE — 84439 ASSAY OF FREE THYROXINE: CPT | Performed by: FAMILY MEDICINE

## 2024-04-01 PROCEDURE — 84450 TRANSFERASE (AST) (SGOT): CPT | Performed by: FAMILY MEDICINE

## 2024-04-01 RX ORDER — BUPROPION HYDROCHLORIDE 150 MG/1
150 TABLET ORAL DAILY
Qty: 90 TABLET | Refills: 3 | Status: SHIPPED | OUTPATIENT
Start: 2024-04-01

## 2024-04-01 RX ORDER — SPIRONOLACTONE 25 MG/1
25 TABLET ORAL DAILY
Qty: 90 TABLET | Refills: 3 | Status: SHIPPED | OUTPATIENT
Start: 2024-04-01

## 2024-04-01 ASSESSMENT — ANXIETY QUESTIONNAIRES
2. NOT BEING ABLE TO STOP OR CONTROL WORRYING: SEVERAL DAYS
4. TROUBLE RELAXING: MORE THAN HALF THE DAYS
6. BECOMING EASILY ANNOYED OR IRRITABLE: MORE THAN HALF THE DAYS
GAD7 TOTAL SCORE: 11
7. FEELING AFRAID AS IF SOMETHING AWFUL MIGHT HAPPEN: SEVERAL DAYS
3. WORRYING TOO MUCH ABOUT DIFFERENT THINGS: MORE THAN HALF THE DAYS
IF YOU CHECKED OFF ANY PROBLEMS ON THIS QUESTIONNAIRE, HOW DIFFICULT HAVE THESE PROBLEMS MADE IT FOR YOU TO DO YOUR WORK, TAKE CARE OF THINGS AT HOME, OR GET ALONG WITH OTHER PEOPLE: SOMEWHAT DIFFICULT
8. IF YOU CHECKED OFF ANY PROBLEMS, HOW DIFFICULT HAVE THESE MADE IT FOR YOU TO DO YOUR WORK, TAKE CARE OF THINGS AT HOME, OR GET ALONG WITH OTHER PEOPLE?: SOMEWHAT DIFFICULT
1. FEELING NERVOUS, ANXIOUS, OR ON EDGE: MORE THAN HALF THE DAYS
GAD7 TOTAL SCORE: 11
7. FEELING AFRAID AS IF SOMETHING AWFUL MIGHT HAPPEN: SEVERAL DAYS
GAD7 TOTAL SCORE: 11
5. BEING SO RESTLESS THAT IT IS HARD TO SIT STILL: SEVERAL DAYS

## 2024-04-01 NOTE — ASSESSMENT & PLAN NOTE
Doing well on current dosing.  With recent stresses and changes there has been a slight exacerbation but do not recommend change in medication dosing at this point.  Continue current self-care.

## 2024-04-01 NOTE — ASSESSMENT & PLAN NOTE
Was better controlled when taking spironolactone.  As such will restart.  Will watch blood pressure closely.  Side effects precautions with medications reviewed.

## 2024-04-01 NOTE — ASSESSMENT & PLAN NOTE
Has been controlled on simvastatin 40 mg daily.  Will recheck levels and adjust treatment as needed.

## 2024-04-01 NOTE — PROGRESS NOTES
Assessment & Plan   Problem List Items Addressed This Visit       Hyperlipidemia - Primary     Has been controlled on simvastatin 40 mg daily.  Will recheck levels and adjust treatment as needed.         Relevant Orders    Lipid panel reflex to direct LDL Non-fasting    AST    Fibromyalgia     Doing well on current dosing.  With recent stresses and changes there has been a slight exacerbation but do not recommend change in medication dosing at this point.  Continue current self-care.         Relevant Medications    buPROPion (WELLBUTRIN XL) 150 MG 24 hr tablet    Hypothyroidism     Has been stable on current dosing of levothyroxine 112 mcg daily.  Will recheck levels and adjust treatment as needed based on results.         Relevant Orders    TSH WITH FREE T4 REFLEX    CBC with platelets (Completed)    Rosacea     Was better controlled when taking spironolactone.  As such will restart.  Will watch blood pressure closely.  Side effects precautions with medications reviewed.         Relevant Medications    budesonide (RINOCORT AQUA) 32 MCG/ACT nasal spray    spironolactone (ALDACTONE) 25 MG tablet     Other Visit Diagnoses       Diabetes mellitus screening        Relevant Orders    Basic metabolic panel  (Ca, Cl, CO2, Creat, Gluc, K, Na, BUN)              Regular exercise  See Patient Instructions      Suad Danielle is a 53 year old, presenting for the following health issues:  Follow Up (Hypothyroidism; Fibromyalgia)      4/1/2024    11:31 AM   Additional Questions   Roomed by CURTIS Preciado   Accompanied by Self         4/1/2024    11:31 AM   Patient Reported Additional Medications   Patient reports taking the following new medications N/A     Overall has been doing well.  Denies any chest pain, shortness of breath, dyspnea exertion, palpitations, nausea or vomiting.  Recent medical history was updated to include the fracture of the patella and the repair that took place.  Her activity level did go down is slowly  "coming back to normal at this point but still not back to baseline.  With that she has had a little bit elevation of her anxiety as part of her control which was walking daily has not been at his normal level.  Denies any homicidal or suicidal ideation.  She did stop her spironolactone for her rosacea a while ago because of her fear of kidney dysfunction in the long-term, however she is having more more trouble with the rosacea and would like to restart spironolactone.    History of Present Illness       Mental Health Follow-up:  Patient presents to follow-up on Anxiety.    Patient's anxiety since last visit has been:  Medium  The patient is not having other symptoms associated with anxiety.  Any significant life events: job concerns  Patient is feeling anxious or having panic attacks.  Patient has no concerns about alcohol or drug use.    Hyperlipidemia:  She presents for follow up of hyperlipidemia.   She is taking medication to lower cholesterol. She is not having myalgia or other side effects to statin medications.    Hypothyroidism:     Since last visit, patient describes the following symptoms::  Anxiety, Constipation, Dry skin, Fatigue and Weight gain    Weight gain::  6-10 lbs.    Weight loss::  No weight loss    Reason for visit:  Rx review    She eats 2-3 servings of fruits and vegetables daily.She consumes 0 sweetened beverage(s) daily.She exercises with enough effort to increase her heart rate 9 or less minutes per day.  She exercises with enough effort to increase her heart rate 3 or less days per week.   She is taking medications regularly.           Objective    BP 97/62 (BP Location: Left arm, Patient Position: Sitting, Cuff Size: Adult Large)   Pulse 78   Temp 97.6  F (36.4  C) (Oral)   Resp 12   Ht 1.6 m (5' 3\")   Wt 57.5 kg (126 lb 12.8 oz)   LMP 03/18/2024 (Exact Date)   SpO2 98%   Breastfeeding No   BMI 22.46 kg/m    Body mass index is 22.46 kg/m .  Physical Exam  Vitals and nursing " note reviewed.   Constitutional:       General: She is not in acute distress.     Appearance: Normal appearance. She is not ill-appearing.   HENT:      Head: Normocephalic and atraumatic.   Eyes:      Extraocular Movements: Extraocular movements intact.      Conjunctiva/sclera: Conjunctivae normal.   Cardiovascular:      Rate and Rhythm: Normal rate and regular rhythm.      Pulses: Normal pulses.      Heart sounds: Normal heart sounds.   Pulmonary:      Effort: Pulmonary effort is normal.      Breath sounds: Normal breath sounds.   Musculoskeletal:      Right lower leg: No edema.      Left lower leg: No edema.   Skin:     Capillary Refill: Capillary refill takes less than 2 seconds.   Neurological:      Mental Status: She is alert and oriented to person, place, and time.   Psychiatric:         Attention and Perception: Attention normal.         Mood and Affect: Mood normal.         Speech: Speech normal.         Thought Content: Thought content normal.              Signed Electronically by: Clemente Barrios DO

## 2024-04-01 NOTE — ASSESSMENT & PLAN NOTE
Has been stable on current dosing of levothyroxine 112 mcg daily.  Will recheck levels and adjust treatment as needed based on results.

## 2024-04-02 LAB
ANION GAP SERPL CALCULATED.3IONS-SCNC: 9 MMOL/L (ref 7–15)
AST SERPL W P-5'-P-CCNC: 16 U/L (ref 0–45)
BUN SERPL-MCNC: 12.3 MG/DL (ref 6–20)
CALCIUM SERPL-MCNC: 9.3 MG/DL (ref 8.6–10)
CHLORIDE SERPL-SCNC: 104 MMOL/L (ref 98–107)
CHOLEST SERPL-MCNC: 191 MG/DL
CREAT SERPL-MCNC: 0.92 MG/DL (ref 0.51–0.95)
DEPRECATED HCO3 PLAS-SCNC: 26 MMOL/L (ref 22–29)
EGFRCR SERPLBLD CKD-EPI 2021: 75 ML/MIN/1.73M2
FASTING STATUS PATIENT QL REPORTED: ABNORMAL
GLUCOSE SERPL-MCNC: 89 MG/DL (ref 70–99)
HDLC SERPL-MCNC: 68 MG/DL
LDLC SERPL CALC-MCNC: 107 MG/DL
NONHDLC SERPL-MCNC: 123 MG/DL
POTASSIUM SERPL-SCNC: 4.1 MMOL/L (ref 3.4–5.3)
SODIUM SERPL-SCNC: 139 MMOL/L (ref 135–145)
T4 FREE SERPL-MCNC: 1.78 NG/DL (ref 0.9–1.7)
TRIGL SERPL-MCNC: 80 MG/DL
TSH SERPL DL<=0.005 MIU/L-ACNC: 0.27 UIU/ML (ref 0.3–4.2)

## 2024-04-28 DIAGNOSIS — E78.5 HYPERLIPIDEMIA: ICD-10-CM

## 2024-04-29 RX ORDER — SIMVASTATIN 40 MG
TABLET ORAL
Qty: 90 TABLET | Refills: 2 | Status: SHIPPED | OUTPATIENT
Start: 2024-04-29

## 2024-05-03 ENCOUNTER — NURSE TRIAGE (OUTPATIENT)
Dept: NURSING | Facility: CLINIC | Age: 54
End: 2024-05-03
Payer: COMMERCIAL

## 2024-05-03 NOTE — TELEPHONE ENCOUNTER
Nurse Triage SBAR    Is this a 2nd Level Triage? NO    Situation: Patient having pain in her left breast that has been present for a couple months   Consent: not needed    Background: Has fibrous breasts at baseline- usually has to have a 3d Mammo  Indicates she fell on a chair a couple months back  - indicates this was when the pain started    Recent fracture of her right knee cap- was unable to bend her right leg for 3 months- healing- taking Aleve    Assessment:   indicates having pain in her left breast   - started months ago  - indicates some improvement in the pain since but still present  - rating 3-4/10- indicates nagging annoying pain- indicates present all the time   Does still get her periods but they are irregular- is on hormones via patch    Protocol Recommended Disposition:       Recommendation: Advised to have a visit within the next 2 weeks- reviewed additional care advice with patient and she verbalizes understanding. Assisted in connecting with scheduling.      Scheduling for a visit     Does the patient meet one of the following criteria for ADS visit consideration? 16+ years old, with an [facility] PCP     TIP  Providers, please consider if this condition is appropriate for management at one of our Acute and Diagnostic Services sites.     If patient is a good candidate, please use dotphrase <dot>triageresponse and select Refer to ADS to document.      Padma Brewer RN 10:54 AM 5/3/2024  Reason for Disposition   Breast pain and cause is not known    Additional Information   Negative: Chest pain   Negative: Breastfeeding questions about baby   Negative: Breastfeeding questions about mother (breast symptoms or feeling sick)   Negative: Breastfeeding questions about mother's medicines and diet   Negative: Postpartum breast pain and swelling, not breastfeeding   Negative: Small spot, skin growth or mole   Negative: SEVERE breast pain and fever > 103 F  (39.4 C)   Negative: Patient sounds very sick or  "weak to the triager   Negative: Breast looks infected (spreading redness, feels hot or painful to touch) and no fever   Negative: Painful rash and multiple small blisters grouped together (i.e., dermatomal distribution or \"band\" or \"stripe\")   Negative: Cuts, burns, or bruises of breasts and suspicious history for the injury   Negative: Breast lump   Negative: Nipple discharge and bloody   Negative: Nipple is inverted (i.e., points inward)  (Exception: Long-term physical characteristic, present for many years.)   Negative: Dry flaking-peeling skin of nipple   Negative: Change in shape or appearance of breast   Negative: Dimpling of skin of breast (i.e., skin looks like the outside of an orange peel)   Negative: Patient wants to be seen   Negative: Breast tenderness and fullness and pregnant   Negative: Nipple discharge and not bloody (e.g., clear, white, yellow, brown, green)   Negative: Breast pain or tenderness that occurs monthly before menstrual period, and has NOT been evaluated by a doctor (or NP/PA)    Protocols used: Breast Symptoms-A-[place]    "

## 2024-05-15 ENCOUNTER — OFFICE VISIT (OUTPATIENT)
Dept: FAMILY MEDICINE | Facility: CLINIC | Age: 54
End: 2024-05-15
Payer: COMMERCIAL

## 2024-05-15 VITALS
OXYGEN SATURATION: 98 % | TEMPERATURE: 98.6 F | SYSTOLIC BLOOD PRESSURE: 106 MMHG | RESPIRATION RATE: 14 BRPM | HEART RATE: 85 BPM | WEIGHT: 126 LBS | DIASTOLIC BLOOD PRESSURE: 54 MMHG | HEIGHT: 64 IN | BODY MASS INDEX: 21.51 KG/M2

## 2024-05-15 DIAGNOSIS — N64.4 BREAST PAIN: Primary | ICD-10-CM

## 2024-05-15 PROCEDURE — 99214 OFFICE O/P EST MOD 30 MIN: CPT | Performed by: NURSE PRACTITIONER

## 2024-05-15 ASSESSMENT — PAIN SCALES - GENERAL: PAINLEVEL: MILD PAIN (2)

## 2024-05-15 NOTE — PROGRESS NOTES
Assessment and Plan:     Breast pain  Differentials include cyst, myofascial pain, cancer.  Will obtain diagnostic mammogram and ultrasound for further evaluation.  Recommend taking over-the-counter acetaminophen or ibuprofen as needed for pain.  Patient is now wearing a supportive bra.  She is to follow-up with Dr. Barrios if symptoms persist or worsen.  - MA Diagnostic Digital Bilateral  - US Breast Left Limited 1-3 Quadrants        Subjective:     Lolis is a 53 year old female presenting to the clinic for concerns for left lateral breast pain for several months.  Patient is unsure if it is associated with an injury she may have sustained while reaching for her cat in December.  Pain is a constant ache which fluctuates in intensity.  Patient experiences discomfort when she palpates the area.  She denies any nipple discharge.  She has not palpated any lumps.  She denies any personal or family history of breast cancer.    Reviewof Systems: A complete 14 point review of systems was obtained and is negative or as stated in the history of present illness.    Social History     Socioeconomic History    Marital status:      Spouse name: Hugo    Number of children: 1    Years of education: 16    Highest education level: Bachelor's degree (e.g., BA, AB, BS)   Occupational History    Occupation: Lead Specialist   Tobacco Use    Smoking status: Never     Passive exposure: Never    Smokeless tobacco: Never   Vaping Use    Vaping status: Never Used   Substance and Sexual Activity    Alcohol use: Not Currently    Drug use: Never    Sexual activity: Yes     Partners: Male     Birth control/protection: Pill   Other Topics Concern    Not on file   Social History Narrative    She currently lives with family and does not work.     Social Determinants of Health     Financial Resource Strain: Not on file   Food Insecurity: Not on file   Transportation Needs: Not on file   Physical Activity: Not on file   Stress: Not on file    Social Connections: Unknown (12/19/2022)    [email or web address] [employer] [facility]    Social Connections     Frequency of Communication with Friends and Family: Not on file   Interpersonal Safety: Low Risk  (4/1/2024)    Interpersonal Safety     Do you feel physically and emotionally safe where you currently live?: Yes     Within the past 12 months, have you been hit, slapped, kicked or otherwise physically hurt by someone?: No     Within the past 12 months, have you been humiliated or emotionally abused in other ways by your partner or ex-partner?: No   Housing Stability: Not on file       Active Ambulatory Problems     Diagnosis Date Noted    Hyperlipidemia     Fibromyalgia     Premenstrual Disorder     Macules And Papules     Hypothyroidism     Esophageal Reflux     Constipation     Allergic Rhinitis     Other fatigue     Rosacea 11/11/2014    Family history of colon cancer 02/23/2015    Vitamin D deficiency 12/08/2017    Myalgia 07/17/2020    Acute right-sided low back pain without sciatica 11/19/2021    Cat scratch of right forearm 07/27/2023    Positive ADILSON (antinuclear antibody) 01/31/2023     Resolved Ambulatory Problems     Diagnosis Date Noted    No Resolved Ambulatory Problems     Past Medical History:   Diagnosis Date    Abnormal weight loss     Fibrocystic breast     Hyperlipemia     Neoplasm     Ovarian cyst        Family History   Problem Relation Age of Onset    Cancer Maternal Grandmother         Uterine    Colon Polyps Mother     Cerebrovascular Disease Mother     Dementia Mother     Alzheimer Disease Mother     Hypertension Mother     Other - See Comments Father         Blood Disorder    Cerebrovascular Disease Father     Heart Disease Father     Thyroid Disease Brother     No Known Problems Sister     No Known Problems Brother     Alcoholism Sister     Hereditary Breast and Ovarian Cancer Syndrome No family hx of     Breast  "Cancer No family hx of     Colon Cancer No family hx of     Endometrial Cancer No family hx of     Ovarian Cancer No family hx of        Objective:     /54   Pulse 85   Temp 98.6  F (37  C)   Resp 14   Ht 1.613 m (5' 3.5\")   Wt 57.2 kg (126 lb)   LMP 05/01/2024 (Approximate)   SpO2 98%   Breastfeeding No   BMI 21.97 kg/m      Patient is alert, in no obvious distress.   Skin: Warm, dry.    Lungs:  Clear to auscultation. Respirations even and unlabored.  No wheezing or rales noted.   Heart:  Regular rate and rhythm.  No murmurs, S3, S4, gallops, or rubs.    Breasts: No nodularities or lumps palpated.  No surrounding adenopathy.  She is tender to palpation of her left lateral breast.               Answers submitted by the patient for this visit:  General Questionnaire (Submitted on 5/15/2024)  Chief Complaint: Chronic problems general questions HPI Form  How many servings of fruits and vegetables do you eat daily?: 2-3  On average, how many sweetened beverages do you drink each day (Examples: soda, juice, sweet tea, etc.  Do NOT count diet or artificially sweetened beverages)?: 0  How many minutes a day do you exercise enough to make your heart beat faster?: 10 to 19  How many days a week do you exercise enough to make your heart beat faster?: 4  How many days per week do you miss taking your medication?: 0  General Concern (Submitted on 5/15/2024)  Chief Complaint: Chronic problems general questions HPI Form  What is the reason for your visit today?: pain in left breast  When did your symptoms begin?: More than a month  What are your symptoms?: pain  How would you describe these symptoms?: Mild  Are your symptoms:: Improving  Have you had these symptoms before?: No  Is there anything that makes you feel worse?: pressure  Is there anything that makes you feel better?: no pressure    "

## 2024-05-18 ENCOUNTER — HEALTH MAINTENANCE LETTER (OUTPATIENT)
Age: 54
End: 2024-05-18

## 2024-06-11 ENCOUNTER — HOSPITAL ENCOUNTER (OUTPATIENT)
Dept: MAMMOGRAPHY | Facility: CLINIC | Age: 54
Discharge: HOME | End: 2024-06-11
Attending: NURSE PRACTITIONER
Payer: COMMERCIAL

## 2024-06-11 DIAGNOSIS — N64.4 BREAST PAIN: ICD-10-CM

## 2024-06-11 PROCEDURE — 77062 BREAST TOMOSYNTHESIS BI: CPT

## 2024-06-11 PROCEDURE — 76642 ULTRASOUND BREAST LIMITED: CPT | Mod: LT

## 2024-09-08 NOTE — TELEPHONE ENCOUNTER
Refill Approved    Rx renewed per Medication Renewal Policy. Medication was last renewed on 7/15/18.    Aniyah Alcala, [facility] Triage/Med Refill 1/22/2019     Requested Prescriptions   Pending Prescriptions Disp Refills     levothyroxine (SYNTHROID, LEVOTHROID) 100 MCG tablet [Pharmacy Med Name: LEVOTHYROXINE 100 MCG TABLET] 90 tablet 1     Sig: TAKE 1 TABLET BY MOUTH EVERY DAY    Thyroid Hormones Protocol Passed - 1/20/2019  8:31 AM       Passed - Provider visit in past 12 months or next 3 months    Last office visit with prescriber/PCP: 12/10/2018 Clemente Barrios DO OR same dept: 12/10/2018 Clemente Barrios DO OR same specialty: 1/7/2019 Jessica Madsen, DERICK  Last physical: 12/21/2015 Last MTM visit: Visit date not found   Next visit within 3 mo: Visit date not found  Next physical within 3 mo: Visit date not found  Prescriber OR PCP: Clemente Barrios DO  Last diagnosis associated with med order: 1. Hypothyroidism  - levothyroxine (SYNTHROID, LEVOTHROID) 100 MCG tablet [Pharmacy Med Name: LEVOTHYROXINE 100 MCG TABLET]; TAKE 1 TABLET BY MOUTH EVERY DAY  Dispense: 90 tablet; Refill: 1    If protocol passes may refill for 12 months if within 3 months of last provider visit (or a total of 15 months).            Passed - TSH on file in past 12 months for patient age 12 & older    TSH   Date Value Ref Range Status   12/12/2018 1.97 0.30 - 5.00 uIU/mL Final                                [facility]

## 2025-02-13 DIAGNOSIS — E78.5 HYPERLIPIDEMIA: ICD-10-CM

## 2025-02-13 RX ORDER — SIMVASTATIN 40 MG
TABLET ORAL
Qty: 90 TABLET | Refills: 0 | OUTPATIENT
Start: 2025-02-13

## 2025-02-13 NOTE — TELEPHONE ENCOUNTER
1/17/2025 #90  simvastatin (ZOCOR) 40 MG tablet     Note to Pharmacy: Medication filled one time only as pt is due for a follow-up with PCP/Lab for further refills.

## 2025-08-10 ENCOUNTER — HEALTH MAINTENANCE LETTER (OUTPATIENT)
Age: 55
End: 2025-08-10